# Patient Record
Sex: MALE | Race: WHITE
[De-identification: names, ages, dates, MRNs, and addresses within clinical notes are randomized per-mention and may not be internally consistent; named-entity substitution may affect disease eponyms.]

---

## 2019-11-08 ENCOUNTER — HOSPITAL ENCOUNTER (EMERGENCY)
Dept: HOSPITAL 74 - JER | Age: 79
Discharge: HOME | End: 2019-11-08
Payer: COMMERCIAL

## 2019-11-08 VITALS — HEART RATE: 76 BPM | SYSTOLIC BLOOD PRESSURE: 148 MMHG | DIASTOLIC BLOOD PRESSURE: 72 MMHG

## 2019-11-08 VITALS — TEMPERATURE: 98.7 F

## 2019-11-08 VITALS — BODY MASS INDEX: 24.5 KG/M2

## 2019-11-08 DIAGNOSIS — J43.9: ICD-10-CM

## 2019-11-08 DIAGNOSIS — Z98.890: ICD-10-CM

## 2019-11-08 DIAGNOSIS — Z86.11: ICD-10-CM

## 2019-11-08 DIAGNOSIS — S61.210A: ICD-10-CM

## 2019-11-08 DIAGNOSIS — I25.10: ICD-10-CM
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
## 2019-11-08 DIAGNOSIS — S09.8XXA: Primary | ICD-10-CM

## 2019-11-08 DIAGNOSIS — S00.81XA: ICD-10-CM
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
## 2019-11-08 DIAGNOSIS — Z95.5: ICD-10-CM

## 2019-11-08 LAB
ALBUMIN SERPL-MCNC: 3.5 G/DL (ref 3.4–5)
ALP SERPL-CCNC: 68 U/L (ref 45–117)
ALT SERPL-CCNC: 14 U/L (ref 13–61)
ANION GAP SERPL CALC-SCNC: 5 MMOL/L (ref 8–16)
AST SERPL-CCNC: 14 U/L (ref 15–37)
BASOPHILS # BLD: 0.7 % (ref 0–2)
BILIRUB SERPL-MCNC: 0.5 MG/DL (ref 0.2–1)
BUN SERPL-MCNC: 19.2 MG/DL (ref 7–18)
CALCIUM SERPL-MCNC: 8.7 MG/DL (ref 8.5–10.1)
CHLORIDE SERPL-SCNC: 112 MMOL/L (ref 98–107)
CO2 SERPL-SCNC: 25 MMOL/L (ref 21–32)
CREAT SERPL-MCNC: 0.8 MG/DL (ref 0.55–1.3)
DEPRECATED RDW RBC AUTO: 13.4 % (ref 11.9–15.9)
EOSINOPHIL # BLD: 0.7 % (ref 0–4.5)
GLUCOSE SERPL-MCNC: 84 MG/DL (ref 74–106)
HCT VFR BLD CALC: 44.2 % (ref 35.4–49)
HGB BLD-MCNC: 15.1 GM/DL (ref 11.7–16.9)
LYMPHOCYTES # BLD: 13.3 % (ref 8–40)
MCH RBC QN AUTO: 32.8 PG (ref 25.7–33.7)
MCHC RBC AUTO-ENTMCNC: 34.1 G/DL (ref 32–35.9)
MCV RBC: 96.2 FL (ref 80–96)
MONOCYTES # BLD AUTO: 5.9 % (ref 3.8–10.2)
NEUTROPHILS # BLD: 79.4 % (ref 42.8–82.8)
PLATELET # BLD AUTO: 180 K/MM3 (ref 134–434)
PMV BLD: 8.5 FL (ref 7.5–11.1)
POTASSIUM SERPLBLD-SCNC: 3.8 MMOL/L (ref 3.5–5.1)
PROT SERPL-MCNC: 6.7 G/DL (ref 6.4–8.2)
RBC # BLD AUTO: 4.6 M/MM3 (ref 4–5.6)
SODIUM SERPL-SCNC: 143 MMOL/L (ref 136–145)
WBC # BLD AUTO: 12.5 K/MM3 (ref 4–10)

## 2019-11-08 PROCEDURE — 0HQFXZZ REPAIR RIGHT HAND SKIN, EXTERNAL APPROACH: ICD-10-PCS

## 2019-11-08 PROCEDURE — 3E0234Z INTRODUCTION OF SERUM, TOXOID AND VACCINE INTO MUSCLE, PERCUTANEOUS APPROACH: ICD-10-PCS

## 2019-11-08 NOTE — PDOC
Documentation entered by Hunter Hernández SCRIBE, acting as scribe for Lizzy Conner MD.








Lizzy Conner MD:  This documentation has been prepared by the Betty doan Nirvannie, SCRIBE, under my direction and personally reviewed by me in its 

entirety.  I confirm that the documentation accurately reflects all work, 

treatment, procedures, and medical decision making performed by me.  





Attending Attestation





- Resident


Resident Name: AbrmaJovan





- ED Attending Attestation


I have performed the following: I have examined & evaluated the patient, The 

case was reviewed & discussed with the resident, I agree w/resident's findings 

& plan





- HPI


HPI: 





11/08/19 15:46





Mr. Moore is an 78 y/o M h/o Emphysema, CAD, and TB who presents today s/p 

mechanical fall. 


He lost his balance, fell forward and landed on his hands


(+) head trauma, no LOC, no amnesia 


No chest pain, no shortness of breath, no palpitations


No nausea, vomiting or diarrhea 








- Physicial Exam


PE: 





11/08/19 15:48


GENERAL: The patient is in no acute distress.


HEENT:  bruising and abrasion right lateral orbital ridge, EOMI, PERRLA


Ears normal, nares patent, oropharynx clear without exudates.  


Bite is nmlMoist mucous membranes.  


NECK: Normal range of motion, supple, no midline tenderness to palpation


LUNGS: Breath sounds equal, clear to auscultation bilaterally.  No wheezes, and 

no crackles.


HEART:Regular rate and rhythm, normal S1 and S2 without murmur, rub or gallop.


ABDOMEN: Soft, nontender, normoactive bowel sounds.   


EXTREMITIES: Normal range of motion, no edema.   


NEUROLOGICAL: Cranial nerves II through XII grossly intact.  Normal speech.  No 

focal neurological deficits. 


SKIN: bilateral hand abrasions noted on the dorsum of both hands


11/08/19 16:18








- Medical Decision Making





11/08/19 15:48





79-year-old male presenting to the emergency department status post mechanical 

fall.


No loss of consciousness, no amnesia.





We will do:


Lab


EKG


CT head


Boostrix


Xray hand


ReAssess








Signed out to Dr. Walden pending CT, suture repair, labs


Anticipate discharge

## 2019-11-08 NOTE — PDOC
History of Present Illness





- General


Chief Complaint: Injury


Stated Complaint: FALL


Time Seen by Provider: 11/08/19 14:49





- History of Present Illness


Initial Comments: 





11/08/19 15:10


 Pt is an 80 y/o M with a significant past medical history of Emphysema, CAD, 

and TB who presents to our emergency Department due to a mechanical fall. Pt 

endorses that earlier today, he was walking and lost his balance. Pt endorses 

he landed with his hands facing forward; endorses he hit his head but did not 

lose any consciousness. Endorses lightheadedness before the fall. Denies chest 

pain, palpitations, shortness of breath, changes in vision, nausea/vomiting, or 

confusion,.


PMH CAD s/p 1 stent. Lung resection 2/2 suspicious nodule, emphysema


Social Hx- Smokes [id number] years, denies alcohol


SurgHx- Lung Resection








Past History





- Past Medical History


Allergies/Adverse Reactions: 


 Allergies











Allergy/AdvReac Type Severity Reaction Status Date / Time


 


No Known Allergies Allergy   Verified 11/08/19 15:36











COPD: Yes (emphysema/ TB)





- Surgical History


Cardiac Surgery: Yes (stent x 1 5/20/18 at [facility])





- Psycho Social/Smoking Cessation Hx


Smoking History: Current every day smoker


Number of Cigarettes Smoked Daily: 10


Information on smoking cessation initiated: No


Hx Alcohol Use: No


Drug/Substance Use Hx: No





**Review of Systems





- Review of Systems


Able to Perform ROS?: Yes


Is the patient limited English proficient: No


Constitutional: No: Fever, Weakness


HEENTM: Yes: Blurred Vision (Not a new finding. Endorses h/o blurry vision. ).  

No: Eye Pain, Recent change in vision


Respiratory: No: Cough, Shortness of Breath, Wheezing


Cardiac (ROS): Yes: Lightheadedness.  No: Chest Pain, Irregular Heart Rate, 

Syncope, Chest Tightness


ABD/GI: No: Constipated, Diarrhea, Nausea


Neurological: No: Headache, Numbness, Paresthesia, Unsteady Gait, Ataxia





*Physical Exam





- Vital Signs


 Last Vital Signs











Temp Pulse Resp BP Pulse Ox


 


 98.7 F   68   16   155/72   100 


 


 11/08/19 14:20  11/08/19 14:20  11/08/19 14:20  11/08/19 14:20  11/08/19 14:20














- Physical Exam


HEENT: positive: EOMI, Other (laceration above right eye brow ).  negative: 

Scleral Icterus (R), Scleral Icterus (L)


Neck: positive: Supple


Respiratory/Chest: positive: Decreased Breath Sounds


Cardiovascular: positive: Regular Rhythm, Regular Rate, S1, S2


Gastrointestinal/Abdominal: negative: Distended, Guarding, Rebound, Tenderness


Integumentary: positive: Other (superficial laceration b/l hands. Laceration 

right index finger, no bone or tendon exposure.)


Neurologic: positive: CNs II-XII NML intact, Motor Strength 5/5, Respond to 

painful stimul.  negative: Facial Droop, Numbness, Sensory Deficit





ED Treatment Course





- LABORATORY


CBC & Chemistry Diagram: 


 11/08/19 16:52





 11/08/19 16:52





Medical Decision Making





- Medical Decision Making





11/08/19 15:34


Pt with mechanical fall, head trauma.


Will order Head Ct to assess for any bleed.


Also will order cbc w/ diff, CMP


Will suture right index finger.


Boostrix IM ordered. 


11/08/19 16:57


Performed Laceration repair of right index finger. Full ROM, no sensory loss, 

no exposed bone or tendon.


Informed Pt to rerurn to ER in 5-7 days to remove sutures.


Head CT----> No Ct evidence of acute intracranial pathology. Probable 0.8x0.4 

partially calcified right frontal meningioma. Mild periventricular chronic 

microvascular ischemic changes. Copy given to patient. 


Will d/c patient with close follow up with PMD.  





11/10/19 13:16








Discharge





- Discharge Information


Problems reviewed: Yes


Clinical Impression/Diagnosis: 


 Fall





Condition: Improved


Disposition: HOME





- Admission


No





- Follow up/Referral


Referrals: 


Poornima Aquino [Primary Care Provider] - 





- Patient Discharge Instructions


Patient Printed Discharge Instructions:  DI for Laceration Repair, How to 

Prevent Falls


Additional Instructions: 


You were treated in the Emergency Department due to a head injury due to a fall.











You also received sutures on your right index finger. Please return to the E.R 

in 5-7 days to remove the sutures. 





You may shower with a plastic bag over the finger. 











If you notice any increasing redness, swelling, warmth, or discoloration of the 

finger, please return to the emergency department immediately. If you develop 

any fever, nausea/vomiting, chest pain, headache, shortness of breath or any 

other abnormal symptoms, please return to the emergency department immediately.








Please follow up with your primary care doctor within 1 week.   





- Post Discharge Activity

## 2020-08-28 ENCOUNTER — HOSPITAL ENCOUNTER (INPATIENT)
Dept: HOSPITAL 74 - JER | Age: 80
LOS: 9 days | Discharge: NURSING, REHAB OR LONG-TERM CARE | DRG: 689 | End: 2020-09-06
Attending: NURSE PRACTITIONER | Admitting: INTERNAL MEDICINE
Payer: COMMERCIAL

## 2020-08-28 VITALS — BODY MASS INDEX: 22.7 KG/M2

## 2020-08-28 DIAGNOSIS — E87.6: ICD-10-CM

## 2020-08-28 DIAGNOSIS — Z95.1: ICD-10-CM

## 2020-08-28 DIAGNOSIS — L82.1: ICD-10-CM

## 2020-08-28 DIAGNOSIS — G93.41: ICD-10-CM

## 2020-08-28 DIAGNOSIS — J44.9: ICD-10-CM

## 2020-08-28 DIAGNOSIS — K59.00: ICD-10-CM

## 2020-08-28 DIAGNOSIS — I25.10: ICD-10-CM
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
## 2020-08-28 DIAGNOSIS — I24.8: ICD-10-CM

## 2020-08-28 DIAGNOSIS — I24.9: ICD-10-CM

## 2020-08-28 DIAGNOSIS — R53.1: ICD-10-CM

## 2020-08-28 DIAGNOSIS — R41.82: ICD-10-CM

## 2020-08-28 DIAGNOSIS — N39.0: Primary | ICD-10-CM

## 2020-08-28 DIAGNOSIS — Z90.2: ICD-10-CM

## 2020-08-28 DIAGNOSIS — M62.82: ICD-10-CM

## 2020-08-28 DIAGNOSIS — B96.89: ICD-10-CM

## 2020-08-28 DIAGNOSIS — D72.829: ICD-10-CM

## 2020-08-28 DIAGNOSIS — E44.0: ICD-10-CM

## 2020-08-28 DIAGNOSIS — I12.9: ICD-10-CM

## 2020-08-28 DIAGNOSIS — E78.5: ICD-10-CM

## 2020-08-28 DIAGNOSIS — N18.9: ICD-10-CM

## 2020-08-28 LAB
ALBUMIN SERPL-MCNC: 3.4 G/DL (ref 3.4–5)
ALP SERPL-CCNC: 71 U/L (ref 45–117)
ALT SERPL-CCNC: 31 U/L (ref 13–61)
ANION GAP SERPL CALC-SCNC: 11 MMOL/L (ref 8–16)
APPEARANCE UR: CLEAR
AST SERPL-CCNC: 75 U/L (ref 15–37)
BACTERIA # UR AUTO: (no result) /UL (ref 0–1359)
BASOPHILS # BLD: 0.1 % (ref 0–2)
BILIRUB SERPL-MCNC: 1.9 MG/DL (ref 0.2–1)
BILIRUB UR STRIP.AUTO-MCNC: NEGATIVE MG/DL
BUN SERPL-MCNC: 20.2 MG/DL (ref 7–18)
CALCIUM SERPL-MCNC: 8.7 MG/DL (ref 8.5–10.1)
CASTS URNS QL MICRO: 2 /UL (ref 0–3.1)
CHLORIDE SERPL-SCNC: 108 MMOL/L (ref 98–107)
CO2 SERPL-SCNC: 25 MMOL/L (ref 21–32)
COLOR UR: YELLOW
CREAT SERPL-MCNC: 1.2 MG/DL (ref 0.55–1.3)
DEPRECATED RDW RBC AUTO: 13.9 % (ref 11.9–15.9)
EOSINOPHIL # BLD: 0 % (ref 0–4.5)
EPITH CASTS URNS QL MICRO: 1 /UL (ref 0–25.1)
GLUCOSE SERPL-MCNC: 79 MG/DL (ref 74–106)
HCT VFR BLD CALC: 44.2 % (ref 35.4–49)
HGB BLD-MCNC: 14.6 GM/DL (ref 11.7–16.9)
KETONES UR QL STRIP: (no result)
LEUKOCYTE ESTERASE UR QL STRIP.AUTO: (no result)
LYMPHOCYTES # BLD: 4.2 % (ref 8–40)
MCH RBC QN AUTO: 32.2 PG (ref 25.7–33.7)
MCHC RBC AUTO-ENTMCNC: 33.1 G/DL (ref 32–35.9)
MCV RBC: 97.3 FL (ref 80–96)
MONOCYTES # BLD AUTO: 9.2 % (ref 3.8–10.2)
NEUTROPHILS # BLD: 86.5 % (ref 42.8–82.8)
NITRITE UR QL STRIP: POSITIVE
PH UR: 5 [PH] (ref 5–8)
PLATELET # BLD AUTO: 173 K/MM3 (ref 134–434)
PMV BLD: 8.9 FL (ref 7.5–11.1)
POTASSIUM SERPLBLD-SCNC: 3.4 MMOL/L (ref 3.5–5.1)
PROT SERPL-MCNC: 7.1 G/DL (ref 6.4–8.2)
PROT UR QL STRIP: (no result)
PROT UR QL STRIP: NEGATIVE
RBC # BLD AUTO: 17 /UL (ref 0–23.9)
RBC # BLD AUTO: 4.54 M/MM3 (ref 4–5.6)
SODIUM SERPL-SCNC: 144 MMOL/L (ref 136–145)
SP GR UR: 1.02 (ref 1.01–1.03)
UROBILINOGEN UR STRIP-MCNC: 1 MG/DL (ref 0.2–1)
WBC # BLD AUTO: 19.6 K/MM3 (ref 4–10)
WBC # UR AUTO: 122 /UL (ref 0–25.8)

## 2020-08-28 PROCEDURE — U0003 INFECTIOUS AGENT DETECTION BY NUCLEIC ACID (DNA OR RNA); SEVERE ACUTE RESPIRATORY SYNDROME CORONAVIRUS 2 (SARS-COV-2) (CORONAVIRUS DISEASE [COVID-19]), AMPLIFIED PROBE TECHNIQUE, MAKING USE OF HIGH THROUGHPUT TECHNOLOGIES AS DESCRIBED BY CMS-2020-01-R: HCPCS

## 2020-08-28 NOTE — PDOC
History of Present Illness





- General


Chief Complaint: Injury


Stated Complaint: FALL


Time Seen by Provider: 20 19:46


History Source: Patient


Exam Limitations: No Limitations





- History of Present Illness


Initial Comments: 





20 22:00


78 yo M pmh COPD, HTN, lung resection, h/o TB presenting from home s/p fall with

inability to stand up afterwards. Per EMS, patient was on the ground, unable to 

remain standing without their assistance, unable to remain standing with his w

alker alone. Complaining only of generalized weakness. Son at bedside says he 

seems more confused than this past weekend (normally can hold a complete 

relatively sophisticated conversation) and is talking about having a stroke 4 

weeks ago that did not happen. Also notes some tremulousness that has increased 

since baseline. Patient has had increasingly unsteady gait over the past 2-3 

weeks. Denies headache, nausea, vomiting, fevers, chills, headache, changes in 

his vision. Reports that he was upset with something in his











Past History





- Travel History


Traveled outside of the country in the last 30 days: No


Close contact w/someone who was outside of country & ill: No





- Medical History


Allergies/Adverse Reactions: 


                                    Allergies











Allergy/AdvReac Type Severity Reaction Status Date / Time


 


No Known Allergies Allergy   Verified 19 15:36











COPD: Yes (emphysema/ TB)





- Surgical History


Cardiac Surgery: Yes (stent x 1 18 at [facility])





- Psycho-Social/Smoking History


Smoking History: Former smoker


Have you smoked in the past 12 months: No


Number of Cigarettes Smoked Daily: 20


Information on smoking cessation initiated: Yes





- Substance Abuse Hx (Audit-C & DAST Scrn)


How often the patient has a drink containing alcohol: Never


Score: In Men: 4 or > Positive; In Women: 3 or > Positive: 0


Screen Result (Pos requires Nsg. Audit-10AR): Negative


In the last yr the pt used illegal drug/Rx for NonMed reason: No


Score:  Yes response is considered Positive: 0


Screen Result (Positive result requires Nsg. DAST-10): Negative





**Review of Systems





- Review of Systems


Able to Perform ROS?: Yes


Is the patient limited English proficient: Yes


Constitutional: Yes: Weakness.  No: Chills, Fever


HEENTM: No: Recent change in vision, Nose Pain, Throat Pain


Respiratory: No: Cough, Shortness of Breath, Wheezing, Productive cough


Cardiac (ROS): No: Chest Pain, Edema, Irregular Heart Rate, Lightheadedness, 

Palpitations, Syncope, Chest Tightness


ABD/GI: No: Constipated, Diarrhea, Nausea, Vomiting


: No: Burning, Dysuria, Frequency


Musculoskeletal: Yes: Muscle Weakness.  No: Muscle Pain


Integumentary: No: Bruising, Pruritus, Rash


Neurological: No: Headache, Numbness, Tingling, Weakness


Psychiatric: No: Stressors, Change in Appetite


Endocrine: No: Increased Thirst, Increased Urine, Change in Weight


Hematologic/Lymphatic: No: Anemia, Blood Clots, Easy Bleeding


All Other Systems: Reviewed and Negative





*Physical Exam





- Vital Signs


                                Last Vital Signs











Temp Pulse Resp BP Pulse Ox


 


 99.0 F   74   18   141/60   98 


 


 20 20:25  20 20:25  20 20:25  20 20:25  20 20:25














- Physical Exam





20 22:10


Vitals reviewed, AFVSS 99.0 oral temp


GEN: Appears stated age, NAD, comfortable, confused, room smells of urine. 

AAOx2.


HEENT: NCAT, EOMI, PERRL. Sclera anicteric, non-injected. No facial asymmetry. 

Moist mucous membranes. Normal voice. Trachea midline. 


CV: RRR, S1/S2, no murmurs / rubs / gallops appreciated.


LUNG: CTABL, normal work of breathing. No wheezes, rales, rhonchi. No cough. 

Speaking full sentences. 


GI: Soft, NTND, +BS, no guarding, no rebound. No masses.


EXTREMITIES: 2+ distal pulses. No clubbing / cyanosis / edema. No gross 

deformity in any extremity.


SKIN: Warm, dry, no rashes appreciated, non-jaundiced.


PSYCH: Normal mood and affect. Cooperative and appropriate. 


NEURO: CN grossly intact. Moving all extremities equally. Normal symmetric 

strength and sensation of light touch.








ED Treatment Course





- LABORATORY


CBC & Chemistry Diagram: 


                                 20 21:00





                                 20 21:00





- ADDITIONAL ORDERS


Additional order review: 


                               Laboratory  Results











  20





  21:10 21:00


 


Lactic Acid   1.6


 


Urine Color  Yellow 


 


Urine Appearance  Clear 


 


Urine pH  5.0 


 


Ur Specific Gravity  1.024 


 


Urine Protein  2+ H 


 


Urine Glucose (UA)  Negative 


 


Urine Ketones  3+ H 


 


Urine Blood  3+ H 


 


Urine Nitrite  Positive H 


 


Urine Bilirubin  Negative 


 


Urine Urobilinogen  1.0 


 


Ur Leukocyte Esterase  1+ H 


 


Urine WBC (Auto)  122 


 


Urine RBC (Auto)  17 


 


Urine Casts (Auto)  2 


 


U Epithel Cells (Auto)  1 


 


Urine Bacteria (Auto)  >9,000 








                                        











  20





  21:00


 


RBC  4.54


 


MCV  97.3 H


 


MCHC  33.1


 


RDW  13.9


 


MPV  8.9


 


Neutrophils %  86.5 H


 


Lymphocytes %  4.2 L D


 


Monocytes %  9.2


 


Eosinophils %  0.0  D


 


Basophils %  0.1














- RADIOLOGY


Radiology Studies Ordered: 














 Category Date Time Status


 


 HEAD CT WITHOUT CONTRAST [CT] Stat CT Scan  20 21:40 Taken


 


 CHEST X-RAY PORTABLE* [RAD] Stat Radiology  20 20:29 Taken














Medical Decision Making





- Medical Decision Making





20 22:12


80yo M pmh HTN, COPD, lung resection, h/o TB, presenting s/p fall with 

generalized weakness and inability to get back up. History notable for 

progressive difficulty walking over 2-3 weeks, fall with weakness preventing him

 from getting back up. Exam notable for dark foul smelling urine, fatigue. DDX: 

Infection (UTI vs PNA), CVA, electrolyte abnormality, neurologic disorder.





- Sepsis workup


- NCHCT


- EKG





20 22:28


Labs notable for leukocytosis to 20, elevated troponin 0.37, elevated CK, UTI


LR 1L ordered, plan for repeat troponin afterwards


Ofirmev





EKbpm, sinus, normal axis, notable for significant LVH, T wave biphasic in 

V4, inversion in V5 and V6


Patient adamantly denies chest pain, pressure, shortness of breath





Dispo: Admit Tele for troponin elevation in setting of sepsis with suspected 

urinary source








Discharge





- Discharge Information


Problems reviewed: Yes


Clinical Impression/Diagnosis: 


Fall


Qualifiers:


 Encounter type: initial encounter Qualified Code(s): W19.XXXA - Unspecified 

fall, initial encounter





UTI (urinary tract infection)


Qualifiers:


 Urinary tract infection type: site unspecified Hematuria presence: with 

hematuria Qualified Code(s): N39.0 - Urinary tract infection, site not specified





Condition: Guarded





- Admission


Yes





- Follow up/Referral





- Patient Discharge Instructions





- Post Discharge Activity

## 2020-08-28 NOTE — PN
Teaching Attending Note


Name of Resident: Margo Kendall





ATTENDING PHYSICIAN STATEMENT





I saw and evaluated the patient.


I reviewed the resident's note and discussed the case with the resident.


I agree with the resident's findings and plan as documented.








SUBJECTIVE:


Patient is an 79 year old man with a PMH of COPD, HTN, Lung resection (?for 

Tuberculosis), AAA and Cardiac stents presenting from home after a fall with 

inability to stand up afterwards. Per EMS, patient was on the ground, unable to 

remain standing without their assistance, unable to remain standing with his 

walker alone. Complaining only of generalized weakness. Son at bedside says he 

seems more confused than this past weekend (normally can hold a complete 

relatively sophisticated conversation) and is talking about having a stroke 4 

weeks ago that did not happen. Also notes some tremulousness that has increased 

since baseline. Patient has had increasingly unsteady gait over the past 2-3 

weeks. Denies headache, nausea, vomiting, fevers, chills, headache or changes in

his vision. Patient denies chest pain, shortness of breath, abdominal pain, 

diarrhea, constipation, dysuria, frequency, urgency, melena, hematochezia or 

hematuria. Denies alcohol, tobacco or illicit drug use. No sick contacts or 

recent travels. Family history is unremarkable.  





OBJECTIVE:


Alert and weak


                                   Vital Signs











 Period  Temp  Pulse  Resp  BP Sys/Perez  Pulse Ox


 


 Last 24 Hr  99.0 F-100.0 F  74  18  141/60  98








HEENT: No Jaundice, eye redness or discharge, PERRLA, EOMI. Normocephalic, 

atraumatic; left side keratotic scalp lesion;. External ears are normal and 

hearing is grossly intact. No nasal discharge.


Neck: Supple, nontender. No palpable adenopathy or thyromegaly. No JVD


Chest: Good effort. Clear to auscultation and percussion.


Heart: Regular. No S3, rub or murmur


Abdomen: Not distended, soft, nontender and no HSM. No rebound or guarding. 

Normal bowel sounds.


Ext: Peripheral pulses intact. No leg edema. Left thigh keratotic lesion.


Skin: Warm and dry. No petechiae, rash or ecchymosis.


Neuro: Alert. Oriented to person and place. CN 2-12 grossly intact. Sensation 

grossly intact in all four extremities and DTR are symmetric. Gait not tested 

for safety reasons.


Psych: Appropriate mood and affect. Good insight.


                              Abnormal Lab Results











  08/28/20 08/28/20 08/28/20





  21:00 21:00 21:10


 


WBC  19.6 H  


 


MCV  97.3 H  


 


Absolute Neuts (auto)  17.0 H  


 


Neutrophils %  86.5 H  


 


Lymphocytes %  4.2 L D  


 


Potassium   3.4 L 


 


Chloride   108 H 


 


BUN   20.2 H 


 


Total Bilirubin   1.9 H 


 


AST   75 H 


 


Creatine Kinase   2392 H 


 


CK-MB (CK-2)   8.6 H 


 


Troponin I   0.37 H 


 


Urine Protein    2+ H


 


Urine Ketones    3+ H


 


Urine Blood    3+ H


 


Urine Nitrite    Positive H


 


Ur Leukocyte Esterase    1+ H








                               Current Medications











Generic Name Dose Route Start Last Admin





  Trade Name Freq  PRN Reason Stop Dose Admin


 


Enoxaparin Sodium  40 mg  08/29/20 10:00 





  Lovenox -  SQ  





  DAILY MARISSA  


 


Sodium Chloride  1,000 mls @ 75 mls/hr  08/29/20 00:30 





  Normal Saline -  IV  





  ASDIR MARISSA  


 


Potassium Chloride  10 meq in 100 mls @ 100 mls/hr  08/29/20 00:45 





  Potassium Chloride 10 Meq Premix Ivpb -  IVPB  08/29/20 03:44 





  Q60M [facility]  








ASSESSMENT AND PLAN:


1. Sepsis due to UTI/?Toxic metabolic encephalopathy - No evidence of acute 

intracranial pathology on noncontrast head CT scan; but shows calcified 

meningioma and soft tissue thickening in left frontal scalp. CXR shows RLL 

atelecatasis. Sepsis workup done and patient started on IV Ceftriaxone and IV 

NS. 


EKG shows NSR at 84/minute and QTc 430 with ST depression/J point depression in 

II, III and T wave inversion in V5,V6 and LVH. No old EKG available for 

comparison. Initial troponin is 0.37 - may be demand ischemia and/or decreased 

clearance, but he has cardiac stents - will rule out ACS. 





Has rhabdomyolysis and elevated LFTs. Hypokalemia is unexplained. Will check 

phosphate, serum magnesium, give IV and PO KCL. Will admit to telemetry, trend 

troponin, repeat EKG, get ECHO, RUQ sonogram, trend LFTS, avoid hepatotoxic 

drugs, get fasting lipids, brain MRI, do speech and swallow evaluation, 

neurochecks and implement fall/aspiration/seizure precautions. Consult 

Cardiology/PT/Neurology/ID. Viral testing for COVID-19 ordered and patient 

placed on airborne, droplet and contact isolation. Continue the outpatient 

Dermatology evaluation for left scalp and thigh "keratotic" skin lesion. Will 

continue comprehensive care for all of patients comorbid conditions.





2. Hypertension  Will restart suitable outpatient antihypertensive drugs when 

clinically appropriate. Subsequently, will revise regimen to ensure 

round-the-clock excellent BP control. Patient counseled on the injurious effects

of uncontrolled hypertension. Nonpharmacologic measures to control hypertension 

like weight loss, salt restriction and exercise stressed. Importance of 

adherence to treatment regimen and attainment of normotension emphasized. 





3. DVT prophylaxis - Lovenox 40 mg SQ q 24 hours.    





4. Advance directives - Full code

## 2020-08-28 NOTE — PDOC
Attending Attestation





- Resident


Resident Name: Nuno Awan





- HPI


HPI: 





08/28/20 23:30


Pt presents to the ED complaining of generalized weakness after fall.  Denies 

LOC, but states that he was unable to get up.  on my exam, the patient is 

confused, which is not his baseline as per his son. 


08/29/20 00:24








- Physicial Exam


PE: 





08/29/20 00:25


Agree with resident exam.  Patient is alert and oriented and in no acute 

distress.  Lungs are clear.  CV rr + systolic murmur.  Abdomen soft, non tender,

non distended.  Neuro: alert, oriented x 1.  CN grossly intact. 





- Medical Decision Making





08/29/20 00:27


Pt presents to the ED complaining of generalized weakness.  COnfused on exam.  

Labs show UTI, CT head is negative for acute changes.  Will start antibiotics 

and admit to medicine. 





Discharge





- Discharge Information


Problems reviewed: Yes


Clinical Impression/Diagnosis: 


 Fall





UTI (urinary tract infection)


Qualifiers:


 Urinary tract infection type: site unspecified Hematuria presence: with 

hematuria Qualified Code(s): N39.0 - Urinary tract infection, site not specified





Condition: Guarded





- Follow up/Referral





- Patient Discharge Instructions





- Post Discharge Activity

## 2020-08-29 LAB
ALBUMIN SERPL-MCNC: 3.3 G/DL (ref 3.4–5)
ALP SERPL-CCNC: 73 U/L (ref 45–117)
ALT SERPL-CCNC: 41 U/L (ref 13–61)
ANION GAP SERPL CALC-SCNC: 9 MMOL/L (ref 8–16)
APTT BLD: 23.8 SECONDS (ref 25.2–36.5)
APTT BLD: 31.5 SECONDS (ref 25.2–36.5)
AST SERPL-CCNC: 126 U/L (ref 15–37)
BASOPHILS # BLD: 0.3 % (ref 0–2)
BILIRUB SERPL-MCNC: 1.9 MG/DL (ref 0.2–1)
BUN SERPL-MCNC: 20.8 MG/DL (ref 7–18)
CALCIUM SERPL-MCNC: 8.5 MG/DL (ref 8.5–10.1)
CHLORIDE SERPL-SCNC: 107 MMOL/L (ref 98–107)
CO2 SERPL-SCNC: 25 MMOL/L (ref 21–32)
CREAT SERPL-MCNC: 1.2 MG/DL (ref 0.55–1.3)
DEPRECATED RDW RBC AUTO: 13.8 % (ref 11.9–15.9)
EOSINOPHIL # BLD: 0 % (ref 0–4.5)
GLUCOSE SERPL-MCNC: 66 MG/DL (ref 74–106)
HCT VFR BLD CALC: 43.5 % (ref 35.4–49)
HGB BLD-MCNC: 14.7 GM/DL (ref 11.7–16.9)
INR BLD: 1.28 (ref 0.83–1.09)
INR BLD: 1.32 (ref 0.83–1.09)
LYMPHOCYTES # BLD: 5.7 % (ref 8–40)
MAGNESIUM SERPL-MCNC: 1.8 MG/DL (ref 1.8–2.4)
MCH RBC QN AUTO: 33.2 PG (ref 25.7–33.7)
MCHC RBC AUTO-ENTMCNC: 33.8 G/DL (ref 32–35.9)
MCV RBC: 98.1 FL (ref 80–96)
MONOCYTES # BLD AUTO: 4 % (ref 3.8–10.2)
NEUTROPHILS # BLD: 90 % (ref 42.8–82.8)
PHOSPHATE SERPL-MCNC: 2.4 MG/DL (ref 2.5–4.9)
PLATELET # BLD AUTO: 167 K/MM3 (ref 134–434)
PMV BLD: 9.4 FL (ref 7.5–11.1)
POTASSIUM SERPLBLD-SCNC: 4 MMOL/L (ref 3.5–5.1)
PROT SERPL-MCNC: 7 G/DL (ref 6.4–8.2)
PT PNL PPP: 15.1 SEC (ref 9.7–13)
PT PNL PPP: 15.6 SEC (ref 9.7–13)
RBC # BLD AUTO: 4.44 M/MM3 (ref 4–5.6)
SODIUM SERPL-SCNC: 142 MMOL/L (ref 136–145)
WBC # BLD AUTO: 16.8 K/MM3 (ref 4–10)

## 2020-08-29 RX ADMIN — SODIUM CHLORIDE SCH MLS/HR: 9 INJECTION, SOLUTION INTRAVENOUS at 01:47

## 2020-08-29 RX ADMIN — ENOXAPARIN SODIUM SCH MG: 40 INJECTION SUBCUTANEOUS at 09:46

## 2020-08-29 RX ADMIN — POTASSIUM CHLORIDE SCH MLS/HR: 7.46 INJECTION, SOLUTION INTRAVENOUS at 04:22

## 2020-08-29 RX ADMIN — CEFTRIAXONE SCH MLS/HR: 1 INJECTION, POWDER, FOR SOLUTION INTRAMUSCULAR; INTRAVENOUS at 22:06

## 2020-08-29 RX ADMIN — ASPIRIN SCH MG: 81 TABLET, COATED ORAL at 18:02

## 2020-08-29 RX ADMIN — ATORVASTATIN CALCIUM SCH MG: 40 TABLET, FILM COATED ORAL at 22:05

## 2020-08-29 RX ADMIN — POTASSIUM CHLORIDE SCH MLS/HR: 7.46 INJECTION, SOLUTION INTRAVENOUS at 01:47

## 2020-08-29 RX ADMIN — POTASSIUM CHLORIDE SCH MLS/HR: 7.46 INJECTION, SOLUTION INTRAVENOUS at 02:46

## 2020-08-29 NOTE — CON.CARD
Cardiology Consult (text)





- Consultation


Consultation Note: 


Chief Complaint: Events noted, notes reviewed, unwitnessed fall, questionable 

syncopal episode, generalized weakness, evidence of demand ischemic injury- 

patient currently denies any chest discomfort or dyspnea





History of Present Illness: 


Seen and examined on telemetry. Full consult dictated





History was obtained from patient's son Kin who was contacted via telephone





Medications: 


                                        


                               Current Medications











Generic Name Dose Route Start Last Admin





  Trade Name Freq  PRN Reason Stop Dose Admin


 


Enoxaparin Sodium  40 mg  08/29/20 10:00  08/29/20 09:46





  Lovenox -  SQ   40 mg





  DAILY MARISSA   Administration


 


Sodium Chloride  1,000 mls @ 75 mls/hr  08/29/20 00:30  08/29/20 01:47





  Normal Saline -  IV   75 mls/hr





  ASDIR MARISSA   Administration


 


Ceftriaxone Sodium 1 gm/  50 mls @ 100 mls/hr  08/29/20 22:00 





  Dextrose  IVPB  





  Q24H MARISSA  





  Protocol  











Review of Systems





Unable to obtain/patient is confused and disoriented





Vital Signs: 


                                Last Vital Signs











Temp Pulse Resp BP Pulse Ox


 


 99.6 F   84   24 H  122/61   96 


 


 08/29/20 10:15  08/29/20 10:12  08/29/20 10:12  08/29/20 10:12  08/29/20 09:00








                                 Intake & Output











 08/26/20 08/27/20 08/28/20 08/29/20





 23:59 23:59 23:59 23:59


 


Intake Total    250


 


Output Total    150


 


Balance    100


 


Weight   145 lb 145 lb 0.004 oz











Neck:  Supple Negative JVD


Respiratory:  Diminished Breath Sounds at the Bases


Cardiovascular:  S1 S2 Regular Rate Rhythm


Gastrointestinal:  Soft Benign Normal Bowel Sounds


Ext: Negative Edema  





Labs:


                                        


                                  Troponin, BNP











  08/28/20 08/29/20 08/29/20





  21:00 01:30 05:40


 


Troponin I  0.37 H  0.44 H  0.39 H








                                    CBC, BMP





                                 08/29/20 05:40 





                                 08/29/20 05:40 





                                  Hepatic Panel











Total Bilirubin  1.9 mg/dL (0.2-1)  H  08/29/20  05:40    


 


Direct Bilirubin  0.5 mg/dL (0.0-0.2)  H  08/29/20  05:40    


 


AST  126 U/L (15-37)  H  08/29/20  05:40    


 


ALT  41 U/L (13-61)   08/29/20  05:40    


 


Alkaline Phosphatase  73 U/L ()   08/29/20  05:40    


 


Albumin  3.3 g/dl (3.4-5.0)  L  08/29/20  05:40    








                                    INR, PTT











INR  1.28  (0.83-1.09)  H  08/29/20  05:40    











 Assessment/Plan 





ASSESSMENT:





1.  Unwitnessed fall unclear mechanical versus a syncopal episode


2.  Coronary artery disease post percutaneous coronary intervention with 

evidence of demand ischemic injury angina pectoris (Patient has not had coronary

artery bypass graft surgery as stated in the notes)


3.  Diastolic left ventricular dysfunction with class 0 New [employer] Heart 

Association classification left ventricular failure


4.  Hypertensive cardiovascular disease


5.  Hypercholesterolemia


6.  Organic brain syndrome/dementia


7.  History of lung surgery partial resection


8.  History of chronic obstructive pulmonary disease


9.  Urinary tract infection


10.  Chronic kidney disease


11.  Abnormal liver function testing








PLAN:





1.  Recommend the addition of beta-blocker therapy unless it is absolutely 

contraindicated- Toprol-XL at 25 mg once daily


2.  Recommend the addition of statin therapy unless it is absolutely 

contraindicated


3.  Recommend initiation of Ecotrin therapy


4.  Antibiotics as per the primary team


5.  Echocardiography for evaluation of left ventricular systolic 

function/valvular function





Planned treatment/evaluation was reviewed in detail with patient's son Kin Miller MD

## 2020-08-29 NOTE — EKG
Test Reason : 

Blood Pressure : ***/*** mmHG

Vent. Rate : 084 BPM     Atrial Rate : 084 BPM

   P-R Int : 166 ms          QRS Dur : 092 ms

    QT Int : 364 ms       P-R-T Axes : 088 065 090 degrees

   QTc Int : 430 ms

 

NORMAL SINUS RHYTHM

LEFT VENTRICULAR HYPERTROPHY WITH REPOLARIZATION ABNORMALITY

CANNOT RULE OUT SEPTAL INFARCT , AGE UNDETERMINED

ABNORMAL ECG

NO PREVIOUS ECGS AVAILABLE

Confirmed by IVA CHINCHILLA MD ([id number]) on 8/29/2020 10:15:45 AM

 

Referred By:             Confirmed By:IVA CHINCHILLA MD

## 2020-08-29 NOTE — HP
CHIEF COMPLAINT: fall





PCP: Dr. Marquez ([facility])





HISTORY OF PRESENT ILLNESS:


79 y.o. 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 

was BIB his son after a fall. The son was present at bedside during my exam to 

support the pt's story. Patient says he was sleeping and woke up to use the 

bathroom. When he tried to stand up he fell to the ground and wasn't able to 

stand up. He was able to call for help and was brought to the ED. As per son 

patients mental status is altered from his baseline (normally aox3, complete 

sentences). According to the son, the pt's memory has been progressively 

declining over the past 6 months, however since the fall today he has noticed a 

rapid decline in mental status. At home he ambulates with a cane and is mobile 

on his own.








ER course was notable for:


(1) Rocephin 1g


(2) 2L IVF


(3) EKG: NSR. LVH. rate 84 bpm. qtc 430. ST elevation in V3 no prior comparison 

studies. Repeat EKG @ 4:28AM showing decreasing ST interval in V3. 





Recent Travel: denies





PAST MEDICAL HISTORY: as per hpi





PAST SURGICAL HISTORY: PCI, AAA repair, lobectomy 2/2 TB





Social History: lives alone in independent living housing


Smoking: denies


Alcohol:denies


Drugs: denies





Allergies





No Known Allergies Allergy (Verified 11/08/19 15:36)


   








HOME MEDICATIONS:


REVIEW OF SYSTEMS


CONSTITUTIONAL: 


Absent:  fever, chills, diaphoresis, generalized weakness, malaise, loss of 

appetite, weight change


HEENT: 


Absent:  rhinorrhea, nasal congestion, throat pain, throat swelling, difficulty 

swallowing, mouth swelling, ear pain, eye pain, visual changes


CARDIOVASCULAR: 


Absent: chest pain, syncope, palpitations, irregular heart rate, 

lightheadedness, peripheral edema


RESPIRATORY: 


Absent: cough, shortness of breath, dyspnea with exertion, orthopnea, wheezing, 

stridor, hemoptysis


GASTROINTESTINAL:


Absent: abdominal pain, abdominal distension, nausea, vomiting, diarrhea, 

constipation, melena, hematochezia


GENITOURINARY: 


Absent: dysuria, frequency, urgency, hesitancy, hematuria, flank pain, genital 

pain


MUSCULOSKELETAL: 


Absent: myalgia, arthralgia, joint swelling, back pain, neck pain


SKIN: 


Absent: rash, itching, pallor


HEMATOLOGIC/IMMUNOLOGIC: 


Absent: easy bleeding, easy bruising, lymphadenopathy, frequent infections


ENDOCRINE:


Absent: unexplained weight gain, unexplained weight loss, heat intolerance, cold

intolerance


NEUROLOGIC: 


Absent: headache, focal weakness or paresthesias, dizziness, unsteady gait, 

seizure, mental status changes, bladder or bowel incontinence


PSYCHIATRIC: 


Absent: anxiety, depression, suicidal or homicidal ideation, hallucinations.








PHYSICAL EXAMINATION


                               Vital Signs - 24 hr











  08/28/20 08/28/20 08/28/20





  20:25 23:10 23:55


 


Temperature 99.0 F 100.0 F H 


 


Pulse Rate 74  


 


Pulse Rate [   75





Left Radial]   


 


Respiratory 18  20





Rate   


 


Blood Pressure 141/60  


 


Blood Pressure   123/57 L





[Right Arm]   


 


O2 Sat by Pulse 98  95





Oximetry (%)   














  08/29/20





  02:43


 


Temperature 


 


Pulse Rate 


 


Pulse Rate [ 70





Left Radial] 


 


Respiratory 18





Rate 


 


Blood Pressure 


 


Blood Pressure 109/89





[Right Arm] 


 


O2 Sat by Pulse 96





Oximetry (%) 











GENERAL: Awake, alert, and fully oriented, in no acute distress.


HEENT: left frontal scalp has a chronic soft tissue swelling with areas of 

fungating outgrowth.


LUNGS: Breath sounds equal, clear to auscultation bilaterally. No wheezes, and 

no crackles. No accessory muscle use.


HEART: Regular rate and rhythm, normal S1 and S2.  + systolic murmur left 

sternal border


ABDOMEN: Soft, nontender, not distended, normoactive bowel sounds, no guarding


EXTREMITIES: 2+ pulses, warm, well-perfused. No calf tenderness. No peripheral 

edema. Left media thigh keratosis


NEUROLOGICAL:  Cranial nerves II-XII intact. 


PSYCHIATRIC: Cooperative. Good eye contact. Appropriate mood and affect.


SKIN: Warm, dry, normal turgor, no rashes or lesions noted, normal capillary 

refill. 


                         Laboratory Results - last 24 hr











  08/28/20 08/28/20 08/28/20





  21:00 21:00 21:00


 


WBC  19.6 H  


 


RBC  4.54  


 


Hgb  14.6  


 


Hct  44.2  


 


MCV  97.3 H  


 


MCH  32.2  


 


MCHC  33.1  


 


RDW  13.9  


 


Plt Count  173  


 


MPV  8.9  


 


Absolute Neuts (auto)  17.0 H  


 


Neutrophils %  86.5 H  


 


Lymphocytes %  4.2 L D  


 


Monocytes %  9.2  


 


Eosinophils %  0.0  D  


 


Basophils %  0.1  


 


Nucleated RBC %  0  


 


PT with INR   


 


INR   


 


PTT (Actin FS)   


 


Sodium   144 


 


Potassium   3.4 L 


 


Chloride   108 H 


 


Carbon Dioxide   25 


 


Anion Gap   11 


 


BUN   20.2 H 


 


Creatinine   1.2 


 


Est GFR (CKD-EPI)AfAm   65.79 


 


Est GFR (CKD-EPI)NonAf   56.77 


 


Random Glucose   79 


 


Lactic Acid    1.6


 


Calcium   8.7 


 


Total Bilirubin   1.9 H 


 


AST   75 H 


 


ALT   31 


 


Alkaline Phosphatase   71 


 


Creatine Kinase   2392 H 


 


Creatine Kinase Index   0.3 


 


CK-MB (CK-2)   8.6 H 


 


Troponin I   0.37 H 


 


Total Protein   7.1 


 


Albumin   3.4 


 


Urine Color   


 


Urine Appearance   


 


Urine pH   


 


Ur Specific Gravity   


 


Urine Protein   


 


Urine Glucose (UA)   


 


Urine Ketones   


 


Urine Blood   


 


Urine Nitrite   


 


Urine Bilirubin   


 


Urine Urobilinogen   


 


Ur Leukocyte Esterase   


 


Urine WBC (Auto)   


 


Urine RBC (Auto)   


 


Urine Casts (Auto)   


 


U Epithel Cells (Auto)   


 


Urine Bacteria (Auto)   














  08/28/20 08/29/20 08/29/20





  21:10 01:30 01:30


 


WBC   


 


RBC   


 


Hgb   


 


Hct   


 


MCV   


 


MCH   


 


MCHC   


 


RDW   


 


Plt Count   


 


MPV   


 


Absolute Neuts (auto)   


 


Neutrophils %   


 


Lymphocytes %   


 


Monocytes %   


 


Eosinophils %   


 


Basophils %   


 


Nucleated RBC %   


 


PT with INR   15.60 H 


 


INR   1.32 H 


 


PTT (Actin FS)   31.5 


 


Sodium   


 


Potassium   


 


Chloride   


 


Carbon Dioxide   


 


Anion Gap   


 


BUN   


 


Creatinine   


 


Est GFR (CKD-EPI)AfAm   


 


Est GFR (CKD-EPI)NonAf   


 


Random Glucose   


 


Lactic Acid   


 


Calcium   


 


Total Bilirubin   


 


AST   


 


ALT   


 


Alkaline Phosphatase   


 


Creatine Kinase   


 


Creatine Kinase Index   


 


CK-MB (CK-2)   


 


Troponin I    0.44 H


 


Total Protein   


 


Albumin   


 


Urine Color  Yellow  


 


Urine Appearance  Clear  


 


Urine pH  5.0  


 


Ur Specific Gravity  1.024  


 


Urine Protein  2+ H  


 


Urine Glucose (UA)  Negative  


 


Urine Ketones  3+ H  


 


Urine Blood  3+ H  


 


Urine Nitrite  Positive H  


 


Urine Bilirubin  Negative  


 


Urine Urobilinogen  1.0  


 


Ur Leukocyte Esterase  1+ H  


 


Urine WBC (Auto)  122  


 


Urine RBC (Auto)  17  


 


Urine Casts (Auto)  2  


 


U Epithel Cells (Auto)  1  


 


Urine Bacteria (Auto)  >9,000  











ASSESSMENT/PLAN:


79 y.o. 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 

was BIB his son after a fall.





#Acute toxic metabolic encephalopathy 2/2 UTI


-UA with many bacteria & wbcs


-s/p rocephin in ED; continue


-continue IVF, NS @75cc/hr


-CT head showing subcutaneous soft tissue thickening at the left frontal scalp- 

may be due to dermatologic lesion. Also shows frontal dural calcified meningioma





#EKG changes r/o ACS


-trop 0.37, 0.44- trend


-Noted on EKG: ST elevation in V3. TWI V5 & V6


-f/u repeat EKG stat


-patient denying chest pain currently


-cardiology consulted for EKG changes (Dr. Avelar)


-monitor on telemetry





#Rhabdomyolysis


-trend CPK


-IVF





#Elevated T. bili


-f/u direct bili 


-f/u RUQ U/S





#Seborrheic keratosis


-present on left scalp and left medial thigh


-patient does not endorse discomfort


-CT head negative for acute pathology





#FEN


-NS @75 cc/hr


-K+ repleted


-NPO pending cardio recs





#PPX: lovenox 40 sq daily





#Dispo


telemetry





Family Medical History


Family History: Unable to Obtain





Visit type





- Medication Review


Med list reviewed for High Risk Meds patients 65 and older: Yes





- Emergency Visit


Emergency Visit: Yes


ED Registration Date: 08/28/20


Care time: The patient presented to the Emergency Department on the above date 

and was hospitalized for further evaluation of their emergent condition.





- New Patient


This patient is new to me today: Yes


Date on this admission: 08/30/20





- Critical Care


Critical Care patient: No





ATTENDING PHYSICIAN STATEMENT





I saw and evaluated the patient.


I reviewed the resident's note and discussed the case with the resident.


I agree with the resident's findings and plan as documented.








SUBJECTIVE:








OBJECTIVE:








ASSESSMENT AND PLAN:

## 2020-08-29 NOTE — EKG
Test Reason : 

Blood Pressure : ***/*** mmHG

Vent. Rate : 084 BPM     Atrial Rate : 084 BPM

   P-R Int : 158 ms          QRS Dur : 090 ms

    QT Int : 388 ms       P-R-T Axes : 089 067 087 degrees

   QTc Int : 458 ms

 

SINUS RHYTHM WITH OCCASIONAL PREMATURE VENTRICULAR COMPLEXES

VOLTAGE CRITERIA FOR LEFT VENTRICULAR HYPERTROPHY

CANNOT RULE OUT SEPTAL INFARCT (CITED ON OR BEFORE 28-AUG-2020)

ABNORMAL ECG

WHEN COMPARED WITH ECG OF 28-AUG-2020 21:10,

PREMATURE VENTRICULAR COMPLEXES ARE NOW PRESENT

NONSPECIFIC T WAVE ABNORMALITY HAS REPLACED INVERTED T WAVES IN 

LATERAL LEADS

Confirmed by IVA CHINCHILLA MD ([id number]) on 8/29/2020 10:14:58 AM

 

Referred By:             Confirmed By:IVA CHINCHILLA MD

## 2020-08-29 NOTE — PN
Progress Note, Physician


Chief Complaint: 





Seen and examined in ICU. Pt very anxious about hospitalization.States he feels 

very weak. No chest pain or shortness of breath. Cardiac w/u in progress


History of Present Illness: 





79 y.o. 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 

was BIB his son after a fall.











- Current Medication List


Current Medications: 


Active Medications





Enoxaparin Sodium (Lovenox -)  40 mg SQ DAILY MARISSA


Sodium Chloride (Normal Saline -)  1,000 mls @ 75 mls/hr IV ASDIR MARISSA


   Last Admin: 08/29/20 01:47 Dose:  75 mls/hr


   Documented by: 


Ceftriaxone Sodium 1 gm/ (Dextrose)  50 mls @ 100 mls/hr IVPB Q24H MARISSA; Protocol











- Objective


Vital Signs: 


                                   Vital Signs











Temperature  98.3 F   08/29/20 04:00


 


Pulse Rate  96 H  08/29/20 06:00


 


Respiratory Rate  20   08/29/20 06:00


 


Blood Pressure  120/51 L  08/29/20 06:00


 


O2 Sat by Pulse Oximetry (%)  98   08/29/20 06:00











Constitutional: Yes: No Distress, Anxious, Thin


Eyes: Yes: WNL, Conjunctiva Clear


HENT: Yes: WNL, Atraumatic, Normocephalic, Other ( left scalp lesion noted)


Neck: Yes: WNL, Supple, Trachea Midline


Cardiovascular: Yes: WNL, Regular Rate and Rhythm


Respiratory: Yes: WNL, Regular, CTA Bilaterally


Gastrointestinal: Yes: WNL, Normal Bowel Sounds


...Rectal Exam: Yes: Deferred


Genitourinary: Yes: WNL


Breast(s): Yes: WNL


Musculoskeletal: Yes: WNL


Extremities: Yes: WNL


Edema: No


Peripheral Pulses WNL: Yes


Peripheral Pulses: Left Radial: 2+, Right Radial: 2+, Left Doralis Pedis: 2+, 

Right Dorsalis Pedis: 2+, Left Femoral: 2+, Right Femoral: 2+


Integumentary: Yes: Other (Seborrheic keratosis present on left scalp and left 

medial thigh)


Neurological: Yes: WNL, Alert, Oriented


...Motor Strength: LLE, RLE (generalized weakness)


Psychiatric: Yes: WNL, Alert, Oriented


Labs: 


                                    INR, PTT











INR  1.32  (0.83-1.09)  H  08/29/20  01:30    














- ....Imaging


Chest X-ray: Image Reviewed (no effusion/imfiltrates)


Cat Scan: Report Reviewed (HCT no acute pathology)


Ultrasound: Report Reviewed (fatty infiltattion of lover, AAA with stent)





Problem List





- Problems


(1) COPD (chronic obstructive pulmonary disease)


Assessment/Plan: 


Supplemental O2 to maintain SPO2 >90%


dup nebs prn


Code(s): J44.9 - CHRONIC OBSTRUCTIVE PULMONARY DISEASE, UNSPECIFIED   





(2) HTN (hypertension)


Assessment/Plan: 


normotensive


metoprolol as per cardiology with hold parameters


Code(s): I10 - ESSENTIAL (PRIMARY) HYPERTENSION   





(3) CAD (coronary artery disease)


Assessment/Plan: 


c/w asa,statin,betablocker


appreciate cardiology consultation 


Code(s): I25.10 - ATHSCL HEART DISEASE OF NATIVE CORONARY ARTERY W/O ANG PCTRS  







(4) H/O pneumonectomy


Code(s): Z98.890 - OTHER SPECIFIED POSTPROCEDURAL STATES; Z90.2 - ACQUIRED 

ABSENCE OF LUNG [PART OF]   





(5) S/P CABG (coronary artery bypass graft)


Code(s): Z95.1 - PRESENCE OF AORTOCORONARY BYPASS GRAFT   





(6) Prophylactic measure


Assessment/Plan: 


FEN


Fluids: adequate PO intake


Electrolytes: monitor & replete as needed


Nutrition: low sodium diet





DVT


moderate risk


sq heparin





Dispo


Maintain as inpatient


full code


discharge planning to home vs snf


Code(s): Z29.9 - ENCOUNTER FOR PROPHYLACTIC MEASURES, UNSPECIFIED   





(7) Acute metabolic encephalopathy


Assessment/Plan: 


multifactorial, UTI, advanced age


HCT negative for acute pathology


c/w abx


PT


supportive care





Code(s): G93.41 - METABOLIC ENCEPHALOPATHY   





(8) Rhabdomyolysis


Assessment/Plan: 


CK 2392>3647


c/w IVF


c/t trend


Code(s): M62.82 - RHABDOMYOLYSIS   





(9) Seborrheic keratosis


Code(s): L82.1 - OTHER SEBORRHEIC KERATOSIS   





(10) Fall


Assessment/Plan: 


PT ordered


fall precvautions


monitor on tele to r/o cardiac involment


TTT ordered


Code(s): W19.XXXA - UNSPECIFIED FALL, INITIAL ENCOUNTER   


Qualifiers: 


   Encounter type: initial encounter   Qualified Code(s): W19.XXXA - Unspecified

fall, initial encounter   





(11) UTI (urinary tract infection)


Assessment/Plan: 


+UTI


UCx pending


c/w ceftriaxone until resulted


frequent toileting


Code(s): N39.0 - URINARY TRACT INFECTION, SITE NOT SPECIFIED   


Qualifiers: 


   Urinary tract infection type: site unspecified   Hematuria presence: with 

hematuria   Qualified Code(s): N39.0 - Urinary tract infection, site not 

specified; R31.9 - Hematuria, unspecified   





(12) Constipation


Assessment/Plan: 


retained stool seen on CT


miralax daily


Code(s): K59.00 - CONSTIPATION, UNSPECIFIED   





(13) Suspected COVID-19 virus infection


Assessment/Plan: 


COVID Suspicion low


On 2L NC


no finding on CT


strict airborne/droplet precautions until resulted











Code(s): Z20.828 - CONTACT W AND EXPOSURE TO OTH VIRAL COMMUNICABLE DISEASES   





(14) ACS (acute coronary syndrome)


Assessment/Plan: 


trop 0.37> 0.44>.39


 EKG: ST elevation in V3. TWI V5 & V6


no chest pain currently


maitian on tele 


cardioogy following


c/w asa/BB/statin


TTE ordered





Code(s): I24.9 - ACUTE ISCHEMIC HEART DISEASE, UNSPECIFIED   





(15) Weakness


Assessment/Plan: 


PT


fall precautions


lives home with wife-will discuss with family possible STR if does not improve





Code(s): R53.1 - WEAKNESS   





(16) Moderate protein-calorie malnutrition


Assessment/Plan: 


as evidenced by BMI 22, temporal and muscle wasting, generalized weakness


supplements with meals 


dietician consult [laced





Code(s): E44.0 - MODERATE PROTEIN-CALORIE MALNUTRITION   





Visit type





- Emergency Visit


Emergency Visit: Yes


ED Registration Date: 08/28/20


Care time: The patient presented to the Emergency Department on the above date 

and was hospitalized for further evaluation of their emergent condition.





- New Patient


This patient is new to me today: Yes


Date on this admission: 08/29/20





- Critical Care


Critical Care patient: No





- Discharge Referral


Referred to [facility] No





- Medication Review


Med list reviewed for High Risk Meds patients 65 and older: Yes

## 2020-08-29 NOTE — CONS
DATE OF CONSULTATION:  08/29/2020

 

REQUESTING PHYSICIAN:  Hospitalist service.

 

CHIEF COMPLAINT:  Fall, elevated troponin-I, cardiovascular evaluation.

 

History was predominantly obtained from patient's son who was contacted via

telephone.  Patient currently is confused and disoriented, 79-year-old  male

with known history of coronary artery disease, post percutaneous coronary

intervention/stenting; angina pectoris; diastolic left ventricular dysfunction with

clinical class 0 New [place] Heart Association classification left ventricular failure;

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

syndrome/early dementia; post lung surgery, partial resection, who presented to [facility] Emergency Room from his assisted living place with a fall

and inability to ambulate post fall.  Patient does not have any recollection of the

above-noted event.  It is unclear if patient had a syncopal episode or if it was a

mechanical fall.  Patient currently is resting in bed in wrist restraints.  Denies

any chest discomfort.  Denies any dyspnea.  Patient does not report any orthopnea or

paroxysmal nocturnal dyspnea.  No additional history is obtainable.

 

PAST MEDICAL HISTORY:  Coronary artery disease, post percutaneous coronary

intervention/stenting; angina pectoris; diastolic left ventricular dysfunction with

clinical class 0 New [place] Heart Association classification left ventricular failure;

hypertensive cardiovascular disease; hypercholesterolemia; organic brain

syndrome/dementia; post lung surgery, partial resection; chronic obstructive

pulmonary disease.

 

SOCIAL HISTORY:  Prior history of tobacco abuse.

 

FAMILY HISTORY:  Positive coronary artery disease.

 

ALLERGIES:  None reported.

 

MEDICAL THERAPY AT HOME:  Not known.

 

REVIEW OF SYSTEMS:  Not obtainable related to his underlying

confusion/disorientation.

 

PHYSICAL EXAMINATION:

Vital Signs:  Blood pressure is 122/61 mmHg.  Pulse rate is 84 beats per minute.

Head and Neck:  Pupils equal and reactive to light and accommodation.  Extraocular

muscles are intact.  Anicteric sclera.  Negative JVD.  No bruit appreciated.

Chest:  Diminished breath sounds at the bases bilaterally.

Cardiovascular:  S1, S2.  Regular.  Grade 2/6 systolic apical murmur.  No clicks or

gallops.

Abdomen:  Soft, benign.  Normoactive bowel sounds.

Extremities:  Negative edema.  Distal pulses 1+.

 

Chest x-ray noted.

 

Troponin-I levels were noted.  Peak was 0.44.  CBC with a white cell count 15.8,

hemoglobin 14.7, platelet count 167.  Basic metabolic profile revealed sodium 142,

potassium 4.0, BUN 20.8, creatinine 1.2, glucose 66.  .  INR 1.28.

 

ASSESSMENT:

1.  Unwitnessed fall, unclear mechanical versus a syncopal episode.

2.  Coronary artery disease, post percutaneous coronary intervention with evidence of

demand ischemic injury, angina pectoris (patient has not had coronary artery bypass

graft surgery as stated in some of the notes).

3.  Diastolic left ventricular dysfunction with clinical class 0 New [place] Heart

Association classification left ventricular failure.

4.  Hypertensive cardiovascular disease.

5.  Hypercholesterolemia.

6.  Organic brain syndrome/dementia.

7.  History of lung surgery, partial resection.

8.  History of chronic obstructive pulmonary disease as per the chart.

9.  Urinary tract infection.

10.  Chronic kidney disease.

11.  Abnormal liver function testing.

 

PLAN:

1.  Recommend the addition of beta-blocker therapy unless it is absolutely

contraindicated.  Toprol-XL at 25 mg once daily.

2.  Recommend the addition of statin therapy unless it is absolutely contraindicated.

3.  Recommend initiation of Ecotrin therapy.

4.  Antibiotics as per the primary team.

5.  Echocardiography for evaluation of left ventricular systolic function and

valvular function.

 

Planned treatment/evaluation was reviewed in detail with patient's son.

 

Thank you for your kind referral.

 

 

BARAK YANEZ M.D.

 

[id number]

DD: 08/29/2020 15:12

DT: 08/29/2020 18:46

Job #:  [id number]

## 2020-08-29 NOTE — HP
Admitting History and Physical





- Smoking History


Smoking history: Former smoker


Have you smoked in the past 12 months: No


Aproximately how many cigarettes per day: 20





- Alcohol/Substance Use


Hx Alcohol Use: No





Home Medications





- Allergies


Allergies/Adverse Reactions: 


                                    Allergies











Allergy/AdvReac Type Severity Reaction Status Date / Time


 


No Known Allergies Allergy   Verified 11/08/19 15:36














Physical Examination


Vital Signs: 


                                   Vital Signs











Temperature  98.3 F   08/29/20 04:00


 


Pulse Rate  96 H  08/29/20 06:00


 


Respiratory Rate  20   08/29/20 06:00


 


Blood Pressure  120/51 L  08/29/20 06:00


 


O2 Sat by Pulse Oximetry (%)  98   08/29/20 06:00

## 2020-08-30 LAB
ALBUMIN SERPL-MCNC: 2.9 G/DL (ref 3.4–5)
ALP SERPL-CCNC: 67 U/L (ref 45–117)
ALT SERPL-CCNC: 46 U/L (ref 13–61)
ANION GAP SERPL CALC-SCNC: 5 MMOL/L (ref 8–16)
AST SERPL-CCNC: 107 U/L (ref 15–37)
BASOPHILS # BLD: 0.3 % (ref 0–2)
BILIRUB SERPL-MCNC: 0.9 MG/DL (ref 0.2–1)
BUN SERPL-MCNC: 20.4 MG/DL (ref 7–18)
CALCIUM SERPL-MCNC: 8.4 MG/DL (ref 8.5–10.1)
CHLORIDE SERPL-SCNC: 110 MMOL/L (ref 98–107)
CO2 SERPL-SCNC: 28 MMOL/L (ref 21–32)
CREAT SERPL-MCNC: 1 MG/DL (ref 0.55–1.3)
DEPRECATED RDW RBC AUTO: 13.7 % (ref 11.9–15.9)
EOSINOPHIL # BLD: 0.1 % (ref 0–4.5)
GLUCOSE SERPL-MCNC: 91 MG/DL (ref 74–106)
HCT VFR BLD CALC: 40.9 % (ref 35.4–49)
HGB BLD-MCNC: 13.4 GM/DL (ref 11.7–16.9)
LYMPHOCYTES # BLD: 7.1 % (ref 8–40)
MAGNESIUM SERPL-MCNC: 2.1 MG/DL (ref 1.8–2.4)
MCH RBC QN AUTO: 31.9 PG (ref 25.7–33.7)
MCHC RBC AUTO-ENTMCNC: 32.9 G/DL (ref 32–35.9)
MCV RBC: 97.1 FL (ref 80–96)
MONOCYTES # BLD AUTO: 7.9 % (ref 3.8–10.2)
NEUTROPHILS # BLD: 84.6 % (ref 42.8–82.8)
PLATELET # BLD AUTO: 153 K/MM3 (ref 134–434)
PMV BLD: 8.7 FL (ref 7.5–11.1)
POTASSIUM SERPLBLD-SCNC: 4.2 MMOL/L (ref 3.5–5.1)
PROT SERPL-MCNC: 6.3 G/DL (ref 6.4–8.2)
RBC # BLD AUTO: 4.21 M/MM3 (ref 4–5.6)
SODIUM SERPL-SCNC: 143 MMOL/L (ref 136–145)
WBC # BLD AUTO: 13.8 K/MM3 (ref 4–10)

## 2020-08-30 RX ADMIN — ATORVASTATIN CALCIUM SCH MG: 40 TABLET, FILM COATED ORAL at 21:23

## 2020-08-30 RX ADMIN — CEFTRIAXONE SCH MLS/HR: 1 INJECTION, POWDER, FOR SOLUTION INTRAMUSCULAR; INTRAVENOUS at 21:23

## 2020-08-30 RX ADMIN — POLYETHYLENE GLYCOL 3350 SCH GM: 17 POWDER, FOR SOLUTION ORAL at 10:52

## 2020-08-30 RX ADMIN — ASPIRIN SCH MG: 81 TABLET, COATED ORAL at 10:52

## 2020-08-30 RX ADMIN — ENOXAPARIN SODIUM SCH MG: 40 INJECTION SUBCUTANEOUS at 10:52

## 2020-08-30 RX ADMIN — MULTIVITAMIN TABLET SCH TAB: TABLET at 10:52

## 2020-08-30 RX ADMIN — SODIUM CHLORIDE SCH MLS/HR: 9 INJECTION, SOLUTION INTRAVENOUS at 01:10

## 2020-08-30 NOTE — PN
Progress Note, Physician


History of Present Illness: 





79 y.o. 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 

was BIB his son after a fall.











- Current Medication List


Current Medications: 


Active Medications





Albuterol Sulfate (Ventolin Hfa Inhaler -)  2 puff IH Q6H PRN


   PRN Reason: SHORTNESS OF BREATH


Albuterol/Ipratropium (Duoneb -)  1 amp NEB Q6H PRN


   PRN Reason: SHORTNESS OF BREATH


Aspirin (Ecotrin -)  81 mg PO DAILY On license of [facility]


   Last Admin: 08/29/20 18:02 Dose:  81 mg


   Documented by: 


Atorvastatin Calcium (Lipitor -)  40 mg PO HS On license of [facility]


   Last Admin: 08/29/20 22:05 Dose:  40 mg


   Documented by: 


Enoxaparin Sodium (Lovenox -)  40 mg SQ DAILY On license of [facility]


   Last Admin: 08/29/20 09:46 Dose:  40 mg


   Documented by: 


Sodium Chloride (Normal Saline -)  1,000 mls @ 75 mls/hr IV ASDIR On license of [facility]


   Last Admin: 08/30/20 01:10 Dose:  75 mls/hr


   Documented by: 


Ceftriaxone Sodium 1 gm/ (Dextrose)  50 mls @ 100 mls/hr IVPB Q24H On license of [facility]; Protocol


   Last Admin: 08/29/20 22:06 Dose:  100 mls/hr


   Documented by: 


Metoprolol Succinate (Toprol Xl -)  25 mg PO DAILY On license of [facility]


   Last Admin: 08/29/20 18:02 Dose:  25 mg


   Documented by: 


Multivitamins/Minerals/Vitamin C (Tab-A-Vit -)  1 tab PO DAILY On license of [facility]


Polyethylene Glycol (Miralax (For Daily Use) -)  17 gm PO DAILY On license of [facility]











- Objective


Vital Signs: 


                                   Vital Signs











Temperature  99.2 F   08/30/20 06:00


 


Pulse Rate  76   08/30/20 06:00


 


Respiratory Rate  24 H  08/30/20 06:00


 


Blood Pressure  104/51 L  08/30/20 06:00


 


O2 Sat by Pulse Oximetry (%)  97   08/30/20 06:00











Labs: 


                                    INR, PTT











INR  1.28  (0.83-1.09)  H  08/29/20  05:40    














Problem List





- Problems


(1) COPD (chronic obstructive pulmonary disease)


Code(s): J44.9 - CHRONIC OBSTRUCTIVE PULMONARY DISEASE, UNSPECIFIED   





(2) HTN (hypertension)


Code(s): I10 - ESSENTIAL (PRIMARY) HYPERTENSION   





(3) CAD (coronary artery disease)


Code(s): I25.10 - ATHSCL HEART DISEASE OF NATIVE CORONARY ARTERY W/O ANG PCTRS  







(4) H/O pneumonectomy


Code(s): Z98.890 - OTHER SPECIFIED POSTPROCEDURAL STATES; Z90.2 - ACQUIRED 

ABSENCE OF LUNG [PART OF]   





(5) S/P CABG (coronary artery bypass graft)


Code(s): Z95.1 - PRESENCE OF AORTOCORONARY BYPASS GRAFT   





(6) Prophylactic measure


Code(s): Z29.9 - ENCOUNTER FOR PROPHYLACTIC MEASURES, UNSPECIFIED   





(7) Acute metabolic encephalopathy


Code(s): G93.41 - METABOLIC ENCEPHALOPATHY   





(8) Rhabdomyolysis


Code(s): M62.82 - RHABDOMYOLYSIS   





(9) Seborrheic keratosis


Code(s): L82.1 - OTHER SEBORRHEIC KERATOSIS   





(10) Fall


Code(s): W19.XXXA - UNSPECIFIED FALL, INITIAL ENCOUNTER   


Qualifiers: 


   Encounter type: initial encounter   Qualified Code(s): W19.XXXA - Unspecified

fall, initial encounter   





(11) UTI (urinary tract infection)


Code(s): N39.0 - URINARY TRACT INFECTION, SITE NOT SPECIFIED   


Qualifiers: 


   Urinary tract infection type: site unspecified   Hematuria presence: with 

hematuria   Qualified Code(s): N39.0 - Urinary tract infection, site not 

specified; R31.9 - Hematuria, unspecified   





(12) Constipation


Code(s): K59.00 - CONSTIPATION, UNSPECIFIED   





(13) Suspected COVID-19 virus infection


Code(s): Z20.828 - CONTACT W AND EXPOSURE TO OTH VIRAL COMMUNICABLE DISEASES   





(14) ACS (acute coronary syndrome)


Code(s): I24.9 - ACUTE ISCHEMIC HEART DISEASE, UNSPECIFIED   





(15) Weakness


Code(s): R53.1 - WEAKNESS   





(16) Moderate protein-calorie malnutrition


Code(s): E44.0 - MODERATE PROTEIN-CALORIE MALNUTRITION

## 2020-08-30 NOTE — PN
Physical Exam: 


SUBJECTIVE: Patient seen and examined, NAD











OBJECTIVE:





                                   Vital Signs











 Period  Temp  Pulse  Resp  BP Sys/Perez  Pulse Ox


 


 Last 24 Hr  99.2 F-99.5 F  68-92  22-24  /48-82  95-97











GENERAL: The patient is awake, alert, and fully oriented, in no acute distress.


HEAD: Normal with no signs of trauma.


EYES: PERRL, extraocular movements intact, sclera anicteric, conjunctiva clear. 

No ptosis. 


ENT: Ears normal, nares patent, oropharynx clear without exudates, moist mucous 


membranes.


NECK: Trachea midline, full range of motion, supple. 


LUNGS: Breath sounds equal, clear to auscultation bilaterally, no wheezes, no 

crackles, no 


accessory muscle use. 


HEART: Regular rate and rhythm, S1, S2 without murmur, rub or gallop.


ABDOMEN: Soft, nontender, nondistended, normoactive bowel sounds, no guarding, 

no 


rebound, no hepatosplenomegaly, no masses.


EXTREMITIES: 2+ pulses, warm, well-perfused, no edema. 


NEUROLOGICAL: Cranial nerves II through XII grossly intact. Normal speech, gait 

not 


observed.


PSYCH: Normal mood, normal affect.


SKIN: Warm, dry, normal turgor, no rashes or lesions noted














                         Laboratory Results - last 24 hr











  08/29/20 08/30/20 08/30/20





  08:00 07:00 07:00


 


WBC   13.8 H 


 


RBC   4.21 


 


Hgb   13.4 


 


Hct   40.9 


 


MCV   97.1 H 


 


MCH   31.9 


 


MCHC   32.9 


 


RDW   13.7 


 


Plt Count   153 


 


MPV   8.7 


 


Absolute Neuts (auto)   11.6 H 


 


Neutrophils %   84.6 H 


 


Lymphocytes %   7.1 L D 


 


Monocytes %   7.9  D 


 


Eosinophils %   0.1  D 


 


Basophils %   0.3 


 


Nucleated RBC %   0 


 


Sodium    143


 


Potassium    4.2


 


Chloride    110 H


 


Carbon Dioxide    28


 


Anion Gap    5 L


 


BUN    20.4 H


 


Creatinine    1.0


 


Est GFR (CKD-EPI)AfAm    82.02


 


Est GFR (CKD-EPI)NonAf    70.77


 


Random Glucose    91


 


Calcium    8.4 L


 


Magnesium    2.1


 


Total Bilirubin    0.9


 


AST    107 H


 


ALT    46


 


Alkaline Phosphatase    67


 


Creatine Kinase   


 


Creatine Kinase Index   


 


CK-MB (CK-2)   


 


Total Protein    6.3 L


 


Albumin    2.9 L


 


COVID-19 (DEISI)  Not detected  














  08/30/20





  07:00


 


WBC 


 


RBC 


 


Hgb 


 


Hct 


 


MCV 


 


MCH 


 


MCHC 


 


RDW 


 


Plt Count 


 


MPV 


 


Absolute Neuts (auto) 


 


Neutrophils % 


 


Lymphocytes % 


 


Monocytes % 


 


Eosinophils % 


 


Basophils % 


 


Nucleated RBC % 


 


Sodium 


 


Potassium 


 


Chloride 


 


Carbon Dioxide 


 


Anion Gap 


 


BUN 


 


Creatinine 


 


Est GFR (CKD-EPI)AfAm 


 


Est GFR (CKD-EPI)NonAf 


 


Random Glucose 


 


Calcium 


 


Magnesium 


 


Total Bilirubin 


 


AST 


 


ALT 


 


Alkaline Phosphatase 


 


Creatine Kinase  1728 H


 


Creatine Kinase Index  0.2


 


CK-MB (CK-2)  3.6


 


Total Protein 


 


Albumin 


 


COVID-19 (DEISI) 








Active Medications











Generic Name Dose Route Start Last Admin





  Trade Name Freq  PRN Reason Stop Dose Admin


 


Albuterol Sulfate  2 puff  08/29/20 16:11 





  Ventolin Hfa Inhaler -  IH  





  Q6H PRN  





  SHORTNESS OF BREATH  


 


Albuterol/Ipratropium  1 amp  08/30/20 13:05 





  Duoneb -  NEB  





  Q6H PRN  





  SHORTNESS OF BREATH  


 


Aspirin  81 mg  08/29/20 15:15  08/30/20 10:52





  Ecotrin -  PO   81 mg





  DAILY MARISSA   Administration


 


Atorvastatin Calcium  40 mg  08/29/20 22:00  08/29/20 22:05





  Lipitor -  PO   40 mg





  HS MARISSA   Administration


 


Enoxaparin Sodium  40 mg  08/29/20 10:00  08/30/20 10:52





  Lovenox -  SQ   40 mg





  DAILY MARISSA   Administration


 


Sodium Chloride  1,000 mls @ 75 mls/hr  08/29/20 00:30  08/30/20 01:10





  Normal Saline -  IV   75 mls/hr





  ASDIR MARISSA   Administration


 


Ceftriaxone Sodium 1 gm/  50 mls @ 100 mls/hr  08/29/20 22:00  08/29/20 22:06





  Dextrose  IVPB   100 mls/hr





  Q24H MARISSA   Administration





  Protocol  


 


Metoprolol Succinate  25 mg  08/29/20 15:15  08/30/20 10:52





  Toprol Xl -  PO   25 mg





  DAILY MARISSA   Administration


 


Multivitamins/Minerals/Vitamin C  1 tab  08/30/20 10:00  08/30/20 10:52





  Tab-A-Vit -  PO   1 tab





  DAILY MARISSA   Administration


 


Polyethylene Glycol  17 gm  08/30/20 10:00  08/30/20 10:52





  Miralax (For Daily Use) -  PO   17 gm





  DAILY MARISSA   Administration











ASSESSMENT/PLAN:


 Problems





#ACS (acute coronary syndrome)


Assessment/Plan: 


trop 0.37> 0.44>.39


 EKG: ST elevation in V3. TWI V5 & V6


no chest pain currently


suleman on tele 


cardioogy following


c/w asa/BB/statin


TTE ordered





Code(s): I24.9 - ACUTE ISCHEMIC HEART DISEASE, UNSPECIFIED   





# COPD (chronic obstructive pulmonary disease)


Assessment/Plan: 


Supplemental O2 to maintain SPO2 >90%


dup nebs prn


Code(s): J44.9 - CHRONIC OBSTRUCTIVE PULMONARY DISEASE, UNSPECIFIED   





# HTN (hypertension)


Assessment/Plan: 


normotensive


metoprolol as per cardiology with hold parameters


Code(s): I10 - ESSENTIAL (PRIMARY) HYPERTENSION   





# CAD (coronary artery disease)


Assessment/Plan: 


c/w asa,statin,betablocker


appreciate cardiology consultation 


Code(s): I25.10 - ATHSCL HEART DISEASE OF NATIVE CORONARY ARTERY W/O ANG PCTRS  







#H/O pneumonectomy


Code(s): Z98.890 - OTHER SPECIFIED POSTPROCEDURAL STATES; Z90.2 - ACQUIRED 

ABSENCE OF LUNG [PART OF]   





# S/P CABG (coronary artery bypass graft)


Code(s): Z95.1 - PRESENCE OF AORTOCORONARY BYPASS GRAFT   





# Prophylactic measure


Assessment/Plan: 


FEN


Fluids: adequate PO intake


Electrolytes: monitor & replete as needed


Nutrition: low sodium diet





DVT


moderate risk


sq heparin





Dispo


Maintain as inpatient


full code


discharge planning to home vs snf


Code(s): Z29.9 - ENCOUNTER FOR PROPHYLACTIC MEASURES, UNSPECIFIED   





# Acute metabolic encephalopathy


Assessment/Plan: 


multifactorial, UTI, advanced age


HCT negative for acute pathology


c/w abx


PT


supportive care





Code(s): G93.41 - METABOLIC ENCEPHALOPATHY   





# Rhabdomyolysis


Assessment/Plan: 


CK 2392>3647


c/w IVF


c/t trend


Code(s): M62.82 - RHABDOMYOLYSIS   





# Seborrheic keratosis


Code(s): L82.1 - OTHER SEBORRHEIC KERATOSIS   





# Fall


Assessment/Plan: 


PT ordered


fall precautions


monitor on tele to r/o cardiac involvement


TTT ordered


Code(s): W19.XXXA - UNSPECIFIED FALL, INITIAL ENCOUNTER   


Qualifiers: 


   Encounter type: initial encounter   Qualified Code(s): W19.XXXA - Unspecified

fall, initial encounter   





# UTI (urinary tract infection)


Assessment/Plan: 


+UTI


UCx lactose bacilli 


c/w ceftriaxone 


frequent toileting


Code(s): N39.0 - URINARY TRACT INFECTION, SITE NOT SPECIFIED   


Qualifiers: 


   Urinary tract infection type: site unspecified   Hematuria presence: with 

hematuria   Qualified Code(s): N39.0 - Urinary tract infection, site not 

specified; R31.9 - Hematuria, unspecified   





#Constipation


Assessment/Plan: 


retained stool seen on CT


miralax daily


Code(s): K59.00 - CONSTIPATION, UNSPECIFIED   





# Suspected COVID-19 virus infection


Assessment/Plan: 


COVID Suspicion low


On 2L NC


no finding on CT


strict airborne/droplet precautions until resulted


Code(s): Z20.828 - CONTACT W AND EXPOSURE TO OTH VIRAL COMMUNICABLE DISEASES   








# Weakness


Assessment/Plan: 


PT


fall precautions


lives home with wife-will discuss with family possible STR if does not improve





Code(s): R53.1 - WEAKNESS   





#Moderate protein-calorie malnutrition


Assessment/Plan: 


as evidenced by BMI 22, temporal and muscle wasting, generalized weakness


supplements with meals 


dietician consult [laced





Code(s): E44.0 - MODERATE PROTEIN-CALORIE MALNUTRITION   





Visit type





- Emergency Visit


Emergency Visit: Yes


ED Registration Date: 08/28/20


Care time: The patient presented to the Emergency Department on the above date 

and was hospitalized for further evaluation of their emergent condition.





- New Patient


This patient is new to me today: Yes


Date on this admission: 08/30/20





- Critical Care


Critical Care patient: Yes


Total Critical Care Time (in minutes): 15





- Medication Review


Med list reviewed for High Risk Meds patients 65 and older: No

## 2020-08-30 NOTE — PN
Progress Note (short form)





- Note


Progress Note: 


Chief Complaint: Events noted, notes reviewed, resting in bed, confused and 

disoriented, denies any chest discomfort, denies any dyspnea





History of Present Illness: 


Seen and examined on telemetry.  Events noted, notes reviewed, resting in bed, 

confused and disoriented, denies any chest discomfort, denies any dyspnea





Medications: 


                                        


                               Current Medications











Generic Name Dose Route Start Last Admin





  Trade Name Freq  PRN Reason Stop Dose Admin


 


Albuterol Sulfate  2 puff  08/29/20 16:11 





  Ventolin Hfa Inhaler -  IH  





  Q6H PRN  





  SHORTNESS OF BREATH  


 


Albuterol/Ipratropium  1 amp  08/29/20 15:28 





  Duoneb -  NEB  





  Q6H PRN  





  SHORTNESS OF BREATH  


 


Aspirin  81 mg  08/29/20 15:15  08/29/20 18:02





  Ecotrin -  PO   81 mg





  DAILY MARISSA   Administration


 


Atorvastatin Calcium  40 mg  08/29/20 22:00  08/29/20 22:05





  Lipitor -  PO   40 mg





  HS MARISSA   Administration


 


Enoxaparin Sodium  40 mg  08/29/20 10:00  08/29/20 09:46





  Lovenox -  SQ   40 mg





  DAILY MARISSA   Administration


 


Sodium Chloride  1,000 mls @ 75 mls/hr  08/29/20 00:30  08/30/20 01:10





  Normal Saline -  IV   75 mls/hr





  ASDIR MARISSA   Administration


 


Ceftriaxone Sodium 1 gm/  50 mls @ 100 mls/hr  08/29/20 22:00  08/29/20 22:06





  Dextrose  IVPB   100 mls/hr





  Q24H MARISSA   Administration





  Protocol  


 


Metoprolol Succinate  25 mg  08/29/20 15:15  08/29/20 18:02





  Toprol Xl -  PO   25 mg





  DAILY MARISSA   Administration


 


Multivitamins/Minerals/Vitamin C  1 tab  08/30/20 10:00 





  Tab-A-Vit -  PO  





  DAILY MARISSA  


 


Polyethylene Glycol  17 gm  08/30/20 10:00 





  Miralax (For Daily Use) -  PO  





  DAILY MARISSA  











Review of Systems





Unable to obtain/confused and disoriented





Vital Signs: 


                                        


                                Last Vital Signs











Temp Pulse Resp BP Pulse Ox


 


 99.2 F   76   24 H  104/51 L  97 


 


 08/30/20 06:00  08/30/20 06:00  08/30/20 06:00  08/30/20 06:00  08/30/20 06:00








                                 Intake & Output











 08/27/20 08/28/20 08/29/20 08/30/20





 23:59 23:59 23:59 23:59


 


Intake Total   1550 400


 


Output Total   250 


 


Balance   1300 400


 


Weight  145 lb 145 lb 0.004 oz 











Neck:  Supple Negative JVD


Respiratory:  Diminished Breath Sounds at the Bases


Cardiovascular:  S1 S2 Regular Rate Rhythm


Gastrointestinal:  Soft Benign Normal Bowel Sounds


Ext: Negative Edema  





Labs:


                                    CBC, BMP





                                 08/30/20 07:00 





                                 08/30/20 07:00 








                                        


                                  Troponin, BNP











  08/29/20





  05:40


 


Troponin I  0.39 H








                                    CBC, BMP





                                 08/29/20 05:40 





                                 08/29/20 05:40 





                                  Hepatic Panel











Total Bilirubin  1.9 mg/dL (0.2-1)  H  08/29/20  05:40    


 


Direct Bilirubin  0.5 mg/dL (0.0-0.2)  H  08/29/20  05:40    


 


AST  126 U/L (15-37)  H  08/29/20  05:40    


 


ALT  41 U/L (13-61)   08/29/20  05:40    


 


Alkaline Phosphatase  73 U/L ()   08/29/20  05:40    


 


Albumin  3.3 g/dl (3.4-5.0)  L  08/29/20  05:40    








                                    INR, PTT











INR  1.28  (0.83-1.09)  H  08/29/20  05:40    











 Assessment/Plan 





ASSESSMENT:





1.  Unwitnessed fall unclear mechanical versus a syncopal episode


2.  Coronary artery disease post percutaneous coronary intervention with 

evidence of demand ischemic injury angina pectoris (Patient has not had coronary

artery bypass graft surgery as stated in the notes)


3.  Diastolic left ventricular dysfunction with class 0 New York Heart 

Association classification left ventricular failure


4.  Hypertensive cardiovascular disease


5.  Hypercholesterolemia


6.  Organic brain syndrome/dementia


7.  History of lung surgery partial resection


8.  History of chronic obstructive pulmonary disease


9.  Urinary tract infection


10.  Chronic kidney disease


11.  Abnormal liver function testing








PLAN:





1.  Continue Toprol-XL therapy and dose titration as needed/as tolerated


2.  Recommend the addition of ACE inhibitor or angiotensin receptor blocker 

therapy unless it is absolutely contraindicated provided renal function is at 

baseline


3.  Continue Lipitor therapy


4.  Continue daily Ecotrin therapy


5.  Antibiotics as per the primary team


6.  Echocardiography for evaluation of left ventricular systolic 

function/valvular function














Devendra Miller MD

## 2020-08-31 LAB
ALBUMIN SERPL-MCNC: 2.4 G/DL (ref 3.4–5)
ALP SERPL-CCNC: 62 U/L (ref 45–117)
ALT SERPL-CCNC: 44 U/L (ref 13–61)
ANION GAP SERPL CALC-SCNC: 9 MMOL/L (ref 8–16)
AST SERPL-CCNC: 80 U/L (ref 15–37)
BASOPHILS # BLD: 0.5 % (ref 0–2)
BILIRUB SERPL-MCNC: 0.6 MG/DL (ref 0.2–1)
BUN SERPL-MCNC: 21 MG/DL (ref 7–18)
CALCIUM SERPL-MCNC: 7.7 MG/DL (ref 8.5–10.1)
CHLORIDE SERPL-SCNC: 113 MMOL/L (ref 98–107)
CO2 SERPL-SCNC: 22 MMOL/L (ref 21–32)
CREAT SERPL-MCNC: 0.8 MG/DL (ref 0.55–1.3)
DEPRECATED RDW RBC AUTO: 13.8 % (ref 11.9–15.9)
EOSINOPHIL # BLD: 0.1 % (ref 0–4.5)
GLUCOSE SERPL-MCNC: 83 MG/DL (ref 74–106)
HCT VFR BLD CALC: 36.3 % (ref 35.4–49)
HGB BLD-MCNC: 12.2 GM/DL (ref 11.7–16.9)
LYMPHOCYTES # BLD: 5.5 % (ref 8–40)
MAGNESIUM SERPL-MCNC: 1.9 MG/DL (ref 1.8–2.4)
MCH RBC QN AUTO: 32.5 PG (ref 25.7–33.7)
MCHC RBC AUTO-ENTMCNC: 33.7 G/DL (ref 32–35.9)
MCV RBC: 96.5 FL (ref 80–96)
MONOCYTES # BLD AUTO: 6.3 % (ref 3.8–10.2)
NEUTROPHILS # BLD: 87.6 % (ref 42.8–82.8)
PLATELET # BLD AUTO: 131 K/MM3 (ref 134–434)
PMV BLD: 9.1 FL (ref 7.5–11.1)
POTASSIUM SERPLBLD-SCNC: 3.4 MMOL/L (ref 3.5–5.1)
PROT SERPL-MCNC: 5.4 G/DL (ref 6.4–8.2)
RBC # BLD AUTO: 3.76 M/MM3 (ref 4–5.6)
SODIUM SERPL-SCNC: 144 MMOL/L (ref 136–145)
WBC # BLD AUTO: 9.5 K/MM3 (ref 4–10)

## 2020-08-31 RX ADMIN — ACETAMINOPHEN PRN MG: 10 INJECTION, SOLUTION INTRAVENOUS at 16:54

## 2020-08-31 RX ADMIN — ENOXAPARIN SODIUM SCH MG: 40 INJECTION SUBCUTANEOUS at 09:41

## 2020-08-31 RX ADMIN — MULTIVITAMIN TABLET SCH TAB: TABLET at 09:37

## 2020-08-31 RX ADMIN — CEFTRIAXONE SCH MLS/HR: 1 INJECTION, POWDER, FOR SOLUTION INTRAMUSCULAR; INTRAVENOUS at 22:01

## 2020-08-31 RX ADMIN — SODIUM CHLORIDE SCH MLS/HR: 9 INJECTION, SOLUTION INTRAVENOUS at 09:33

## 2020-08-31 RX ADMIN — ASPIRIN SCH MG: 81 TABLET, COATED ORAL at 09:37

## 2020-08-31 RX ADMIN — POLYETHYLENE GLYCOL 3350 SCH GM: 17 POWDER, FOR SOLUTION ORAL at 09:38

## 2020-08-31 RX ADMIN — ACETAMINOPHEN PRN MG: 10 INJECTION, SOLUTION INTRAVENOUS at 22:25

## 2020-08-31 RX ADMIN — ATORVASTATIN CALCIUM SCH MG: 40 TABLET, FILM COATED ORAL at 22:01

## 2020-08-31 NOTE — PN
Progress Note (short form)





- Note


Progress Note: 





S: Pt febrile to 100.6 overnight. Otherwise no other acute events. Pt denies any

chest pain or discomfort currently. Echo to be performed today. Episodes of 

physiologic bradycardia overnight while sleeping. 





O:


                                   Vital Signs











Temperature  98.6 F   08/31/20 06:00


 


Pulse Rate  61   08/31/20 06:00


 


Respiratory Rate  18   08/31/20 06:00


 


Blood Pressure  96/66   08/31/20 06:00


 


O2 Sat by Pulse Oximetry (%)  99   08/30/20 22:00








PE:


Gen: NAD, awake, alert, slight pallor noted


HEENT: Nc/AT, glasses intact, EOMI, NELLY, dry mucosa


Neck: No JVD, no carotid bruits


LUNG: CTA b/l without wheezes or rales, on RA 99%


CARD: RRR, LUSB 2/6 systolic murmur noted without radiation. S1 and S2 present


ABD: Soft, NT/ND, normoactive BS, no guarding, no masses appreciated


EXT: No edema noted, strong intact pulses b/l





                                    CBC, BMP





                                 08/31/20 06:10 





                                 08/31/20 06:10 





                                  Microbiology





08/28/20 21:00   Blood - Peripheral Venous   Blood Culture - Preliminary


                            NO GROWTH OBTAINED AFTER 48 HOURS, INCUBATION TO 

CONTINUE


                            FOR 3 DAYS.


08/28/20 21:00   Blood - Peripheral Venous   Blood Culture - Preliminary


                            NO GROWTH OBTAINED AFTER 48 HOURS, INCUBATION TO 

CONTINUE


                            FOR 3 DAYS.


08/28/20 21:10   Urine - Urine Clean Catch   Urine Culture - Preliminary


                            Lactose Fermenting Neg Bacilli





A/P:


Uncomplicated cystitis


Acute coronary syndrome


Mechanical fall 


Rhabdomyolysis


Hypokalemia


Protein Malnutrition





--Patient on day 3 of Rocephin; fever noted last night


   --If continue to be febrile will investigate other sources


--Await sensitivities of urine culture; Bcx without growth


--Can switch to oral ABX for total 5 days if remains afebrile and once 

sensitivities return





--ACS noted with acute ECG changes (V3 isolated ST elevation with TWI in V5-6)


--Cardiology on board


--Would ideally benefit from ACEi or ARB therapy, however BP has been low 

normals and would risk becoming hypotensive


--Continue Toprol XL


--Echocardiogram to be performed today


--Continue telemetry monitoring





--Noted mechanical fall, however investigation into cardiac etiology


--Fall precuations


--PT assessment today


--Discussed prior with family member: would like to pursue SNF for STR if 

indicated





--Downtrending CK


--Continue gentle IVF (NS@75cc/hr)


--repeat CK today





FEN:


   Fluids: as above


   Electrolyte abnormalities: Hypokalemia (repleted kdur 40mEq)


   Nutrition: Ensure supplementation for calorie malnutrition; sodium controlled

diet





PPX:


   DVT - Lovenox 40 SQ





Dispo: Continue telemetry monitoring; plan as above


DO Adeola Brewer

## 2020-08-31 NOTE — PN
Progress Note, Physician


History of Present Illness: 


No events on telemetry, denies recurrent near or true syncope.








- Current Medication List


Current Medications: 


Active Medications





Albuterol Sulfate (Ventolin Hfa Inhaler -)  2 puff IH Q6H PRN


   PRN Reason: SHORTNESS OF BREATH


Albuterol/Ipratropium (Duoneb -)  1 amp NEB Q6H PRN


   PRN Reason: SHORTNESS OF BREATH


Aspirin (Ecotrin -)  81 mg PO DAILY [facility]


   Last Admin: 08/30/20 10:52 Dose:  81 mg


   Documented by: 


Atorvastatin Calcium (Lipitor -)  40 mg PO HS [facility]


   Last Admin: 08/30/20 21:23 Dose:  40 mg


   Documented by: 


Enoxaparin Sodium (Lovenox -)  40 mg SQ DAILY [facility]


   Last Admin: 08/30/20 10:52 Dose:  40 mg


   Documented by: 


Sodium Chloride (Normal Saline -)  1,000 mls @ 75 mls/hr IV ASDIR [facility]


   Last Admin: 08/30/20 01:10 Dose:  75 mls/hr


   Documented by: 


Ceftriaxone Sodium 1 gm/ (Dextrose)  50 mls @ 100 mls/hr IVPB Q24H [facility]; Protocol


   Last Admin: 08/30/20 21:23 Dose:  100 mls/hr


   Documented by: 


Metoprolol Succinate (Toprol Xl -)  25 mg PO DAILY [facility]


   Last Admin: 08/30/20 10:52 Dose:  25 mg


   Documented by: 


Multivitamins/Minerals/Vitamin C (Tab-A-Vit -)  1 tab PO DAILY [facility]


   Last Admin: 08/30/20 10:52 Dose:  1 tab


   Documented by: 


Polyethylene Glycol (Miralax (For Daily Use) -)  17 gm PO DAILY [facility]


   Last Admin: 08/30/20 10:52 Dose:  17 gm


   Documented by: 











- Objective


Vital Signs: 


                                   Vital Signs











Temperature  98.0 F   08/31/20 08:20


 


Pulse Rate  61   08/31/20 06:00


 


Respiratory Rate  18   08/31/20 06:00


 


Blood Pressure  96/66   08/31/20 06:00


 


O2 Sat by Pulse Oximetry (%)  95   08/31/20 08:14











Constitutional: Yes: No Distress, Calm


Neck: Yes: Supple


Cardiovascular: Yes: Regular Rate and Rhythm


Respiratory: Yes: Regular, CTA Bilaterally, On Nasal O2


Gastrointestinal: Yes: Normal Bowel Sounds, Soft, Abdomen, Obese


Edema: No


Labs: 


                                    CBC, BMP





                                 08/31/20 06:10 





                                 08/31/20 06:10 





                                    INR, PTT











INR  1.28  (0.83-1.09)  H  08/29/20  05:40    














- ....Imaging


EKG: Report Reviewed (Tele: No sig pauses or bradyarrhythmia)





Problem List





- Problems


(1) CAD (coronary artery disease)


Code(s): I25.10 - ATHSCL HEART DISEASE OF NATIVE CORONARY ARTERY W/O ANG PCTRS  




Qualifiers: 


   Coronary Disease-Associated Artery/Lesion type: native artery   Native vs. 

transplanted heart: native heart   Associated angina: without angina   Qualified

Code(s): I25.10 - Atherosclerotic heart disease of native coronary artery 

without angina pectoris   





(2) COPD (chronic obstructive pulmonary disease)


Code(s): J44.9 - CHRONIC OBSTRUCTIVE PULMONARY DISEASE, UNSPECIFIED   


Qualifiers: 


   COPD type: unspecified COPD   Qualified Code(s): J44.9 - Chronic obstructive 

pulmonary disease, unspecified   





(3) HTN (hypertension)


Code(s): I10 - ESSENTIAL (PRIMARY) HYPERTENSION   


Qualifiers: 


   Hypertension type: essential hypertension   Qualified Code(s): I10 - 

Essential (primary) hypertension   





(4) Diastolic dysfunction


Code(s): I51.89 - OTHER ILL-DEFINED HEART DISEASES   





(5) Demand ischemia


Code(s): I24.8 - OTHER FORMS OF ACUTE ISCHEMIC HEART DISEASE   





(6) Hyperlipidemia


Code(s): E78.5 - HYPERLIPIDEMIA, UNSPECIFIED   


Qualifiers: 


   Hyperlipidemia type: pure hypercholesterolemia   Qualified Code(s): E78.00 - 

Pure hypercholesterolemia, unspecified; E78.0 - Pure hypercholesterolemia   





Assessment/Plan








1.  Unwitnessed fall unclear mechanical versus a syncopal episode


2.  Coronary artery disease post percutaneous coronary intervention with 

evidence of demand ischemic injury angina pectoris (Patient has not had coronary

artery bypass graft surgery as stated in the notes)


3.  Diastolic left ventricular dysfunction with class 0 New York Heart 

Association classification left ventricular failure


4.  Hypertensive cardiovascular disease


5.  Hypercholesterolemia


6.  Organic brain syndrome/dementia


7.  History of lung surgery partial resection


8.  History of chronic obstructive pulmonary disease


9.  Urinary tract infection


10.  Chronic kidney disease


11.  Abnormal liver function testing


12. Fever on empiric abx





PLAN:





1.  Continue Toprol-XL 25 qd and dose titration as needed/as tolerated, 

troponins have plateaued


2.  Recommend the addition of ACE inhibitor or angiotensin receptor blocker 

therapy unless it is absolutely contraindicated provided renal function is at 

baseline


3.  Continue Lipitor 40 qd


4.  Continue daily Ecotrin 81 qd, replete K


5.  Antibiotics as per the primary team


6.  Echocardiography for evaluation of left ventricular systolic functio

n/valvular function


7.  PT->gait training

## 2020-08-31 NOTE — ECHO
______________________________________________________________________________



Name: ANGELA CHAYITO                                   Exam:Adult Echocardiogram

MRN: [id number]         Study Date: 2020 10:05 AM

Age: 79 yrs

______________________________________________________________________________



Reason For Study: ISCHEMIA

Height: 67 in        Weight: 145 lb        BSA: 1.8 m2



______________________________________________________________________________



MMode/2D Measurements & Calculations

IVSd: 1.1 cm                                            Ao root diam: 3.7 cm

LVIDd: 3.9 cm                                           LA dimension: 2.6 cm

LVIDs: 2.6 cm

LVPWd: 1.0 cm



_________________________________________________________

EDV(Teich): 65.7 ml                                     LVOT diam: 2.0 cm

ESV(Teich): 24.3 ml



_________________________________________________________

LAV (MOD-bp): 37.3 ml



Doppler Measurements & Calculations

MV E max sin: 55.5 cm/sec                            Ao V2 max: 78.5 cm/sec

MV A max sin: 84.6 cm/sec                            Ao max P.5 mmHg

MV E/A: 0.66

MV dec time: 0.23 sec                                HUY(V,D): 2.3 cm2



______________________________________________________

LV V1 max P.3 mmHg                               TR max sin: 228.0 cm/sec

LV V1 max: 58.0 cm/sec                               TR max P.0 mmHg



______________________________________________________

PA V2 max: 96.9 cm/sec                               Med Peak E' Sin: 5.0 cm/sec

PA max PG: 3.8 mmHg                                  Med E/e': 11.1

                                                     Lat Peak E' Sin: 6.3 cm/sec

                                                     Lat E/e': 8.8

______________________________________________________



PI Vmax: 132.0 cm/sec



______________________________________________________________________________



Procedure

A complete two-dimensional transthoracic echocardiogram was performed (2D, M-mode, Doppler and color 
flow

Doppler).

Left Ventricle

The left ventricle is normal in size. Left ventricular systolic function is normal. Ejection Fraction
 = 60-

65%. Grade I diastolic dysfunction, (abnormal relaxation pattern). No regional wall motion abnormalit
ies

noted.

Right Ventricle

The right ventricle is normal size. The right ventricular systolic function is normal.

Atria

The left atrial size is normal. Right atrial size is normal.

Mitral Valve

There is mild mitral annular calcification. There is mild mitral regurgitation.

Tricuspid Valve

The tricuspid valve is normal in structure and function. There is mild tricuspid regurgitation. PASP 
is at

least 29 mmHg if RA pressure is assumed 3 mmHg.

Aortic Valve

There is mild aortic sclerosis.;. No aortic regurgitation is present.

Pulmonic Valve

The pulmonic valve is not well visualized.

Great Vessels

The aortic root is normal size.

Pericardium/Pleura

There is no pericardial effusion.

______________________________________________________________________________





Interpretation Summary

The left ventricle is normal in size.

Left ventricular systolic function is normal.

No regional wall motion abnormalities noted.

Ejection Fraction = 60-65%.

Grade I diastolic dysfunction, (abnormal relaxation pattern).

There is mild mitral annular calcification.

There is mild mitral regurgitation.

There is mild tricuspid regurgitation.

PASP is at least 29 mmHg if RA pressure is assumed 3 mmHg

There is mild aortic sclerosis.

There is no pericardial effusion.





Jordy Avelar MD 2020 03:26 PM

## 2020-09-01 LAB
ALBUMIN SERPL-MCNC: 2.3 G/DL (ref 3.4–5)
ALP SERPL-CCNC: 123 U/L (ref 45–117)
ALT SERPL-CCNC: 105 U/L (ref 13–61)
ANION GAP SERPL CALC-SCNC: 6 MMOL/L (ref 8–16)
AST SERPL-CCNC: 138 U/L (ref 15–37)
BASOPHILS # BLD: 0.2 % (ref 0–2)
BILIRUB SERPL-MCNC: 0.6 MG/DL (ref 0.2–1)
BUN SERPL-MCNC: 19 MG/DL (ref 7–18)
CALCIUM SERPL-MCNC: 7.5 MG/DL (ref 8.5–10.1)
CHLORIDE SERPL-SCNC: 113 MMOL/L (ref 98–107)
CO2 SERPL-SCNC: 25 MMOL/L (ref 21–32)
CREAT SERPL-MCNC: 0.9 MG/DL (ref 0.55–1.3)
DEPRECATED RDW RBC AUTO: 14 % (ref 11.9–15.9)
EOSINOPHIL # BLD: 0 % (ref 0–4.5)
GLUCOSE SERPL-MCNC: 105 MG/DL (ref 74–106)
HCT VFR BLD CALC: 36.6 % (ref 35.4–49)
HGB BLD-MCNC: 11.9 GM/DL (ref 11.7–16.9)
LYMPHOCYTES # BLD: 4 % (ref 8–40)
MAGNESIUM SERPL-MCNC: 1.9 MG/DL (ref 1.8–2.4)
MCH RBC QN AUTO: 31.7 PG (ref 25.7–33.7)
MCHC RBC AUTO-ENTMCNC: 32.5 G/DL (ref 32–35.9)
MCV RBC: 97.5 FL (ref 80–96)
MONOCYTES # BLD AUTO: 6.2 % (ref 3.8–10.2)
NEUTROPHILS # BLD: 89.6 % (ref 42.8–82.8)
PLATELET # BLD AUTO: 125 K/MM3 (ref 134–434)
PMV BLD: 8.8 FL (ref 7.5–11.1)
POTASSIUM SERPLBLD-SCNC: 4.1 MMOL/L (ref 3.5–5.1)
PROT SERPL-MCNC: 5.2 G/DL (ref 6.4–8.2)
RBC # BLD AUTO: 3.76 M/MM3 (ref 4–5.6)
SODIUM SERPL-SCNC: 144 MMOL/L (ref 136–145)
WBC # BLD AUTO: 14.8 K/MM3 (ref 4–10)

## 2020-09-01 RX ADMIN — ENOXAPARIN SODIUM SCH MG: 40 INJECTION SUBCUTANEOUS at 09:11

## 2020-09-01 RX ADMIN — POLYETHYLENE GLYCOL 3350 SCH GM: 17 POWDER, FOR SOLUTION ORAL at 09:12

## 2020-09-01 RX ADMIN — MULTIVITAMIN TABLET SCH TAB: TABLET at 09:11

## 2020-09-01 RX ADMIN — ASPIRIN SCH MG: 81 TABLET, COATED ORAL at 09:11

## 2020-09-01 RX ADMIN — SODIUM CHLORIDE SCH: 9 INJECTION, SOLUTION INTRAVENOUS at 23:48

## 2020-09-01 NOTE — CONSULT
Admitting History and Physical





- Admission


History of Present Illness: 





80 y/o male who was BIB his son after mechanical fall, head trauma.





Pt presents with toxic metabolic encephalopathy, moderate protein calorie 

malnutrition. 





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 


                                Selected Entries











  08/29/20 08/31/20 09/01/20





  15:38 18:00 01:32


 


Breakfast NPO  


 


Diet Tolerated  Fair 


 


Lunch 75%  


 


Supper  50% 


 


Temperature   98.4 F


 


Pulse Rate   83


 


Blood Pressure   104/48 L


 


O2 Sat by Pulse   





Oximetry (%)   


 


Oxygen Delivery   





Method   














  09/01/20 09/01/20 09/01/20





  06:00 09:00 11:57


 


Breakfast   0


 


Diet Tolerated   Refused


 


Lunch   


 


Supper   


 


Temperature 97.2 F L 97.9 F 


 


Pulse Rate 76 83 


 


Blood Pressure 118/59 L 153/77 


 


O2 Sat by Pulse  98 





Oximetry (%)   


 


Oxygen Delivery  Room Air 





Method   














  09/01/20





  13:00


 


Breakfast 


 


Diet Tolerated 


 


Lunch 


 


Supper 


 


Temperature 98.1 F


 


Pulse Rate 94 H


 


Blood Pressure 155/87


 


O2 Sat by Pulse 94 L





Oximetry (%) 


 


Oxygen Delivery 





Method 








                                Laboratory Tests











  08/28/20 08/29/20 08/30/20





  21:00 05:40 07:00


 


WBC  19.6 H  16.8 H  13.8 H














  08/31/20 09/01/20





  06:10 05:40


 


WBC  9.5  14.8 H





                                Laboratory Tests











  08/29/20





  08:00


 


COVID-19 (DEISI)  Not detected











On soft/reg diet/thin liquid





Edentulous.





Pt is verbal, friendly and engaging, disoriented to age, place, date. Thinks he 

i8s on [place] [employer] and wants to know how I knew to visit him here. 





He reports feeling congested and seems SOB/anxious. 





History Source: Medical Record


Limitations to Obtaining History: Clinical Condition





- Smoking History


Smoking history: Former smoker


Have you smoked in the past 12 months: No


Aproximately how many cigarettes per day: 20





- Alcohol/Substance Use


Hx Alcohol Use: No





- Social History


Occupation: [employer]





History





- Admission


Reason For Visit: UTI,ELEVATED TROPONIN LEVEL,FALL





- Diagnostics


X-ray: Report Reviewed


CT Scan: Report Reviewed





- General


Mental Status: Awake and Alert, Able to Follow Commands, Forgetful, Confused


Attention: Distractible


Ability to Follow Directions: Good


Head/Neck Control: Good





- Hearing


Hearing: Functional





Speech Evaluation





- Communication


Primary Language: ENGLISH


Communication: Yes: Within Normal Limits


Oral Expression Ability: Yes: No Impairment





- Speech Production


Able to Make Needs Known: Yes: WNL


Intelligibility: Yes: WNL





- Speech Characteristics


Voice Loudness: Normal


Voice Pitch: Yes: Normal


Voice Phonatory-based Quality: Yes: Normal


Speech Pattern: Normal


Speech Clarity: < 100%


Nasal Resonance: Normal


Articulation: Yes: Precise


Rate of Speech: Intact





- Language/Auditory Comprehension


Follows: Yes: 2 Stage Simple Commands


Observation: Able to respond to yes/no queries: Yes, Yes/No Confusion: No, 

Comprehends Conversational Speech: Yes





- Language/Verbal Expression


Able to Respond to Simple Queries: Yes: WNL


Able to Communicate Wants and Needs: Yes: WNL


Functional Communication Status: Yes: WNL





- Swallow Evaluation/Bedside Assessment


Current Nutritional Intake: Regular, Thin Liquids


Oral Secretions: Yes: WFL


Dentition: Yes: Edentulous (Says his dentures are not here)


Facial Symmetry at Rest: Symmetrical


Facial Symmetry on Retraction: Symmetrical


Facial Movement: Controlled


Against Resistance Opening: Normal


Against Resistance Closing: Normal


Pucker Lips: Normal


Smile: Normal


Lingual Movement: Normal, Symmetric


Lingual Speed of Movement: Normal


Lingual Movement Strgth Against Opposition: Normal


Lingual Movement Characteristics: Normal


Laryngeal Movement: Able to Palpate, Labored,delay initiation (very delayed, 

often not triggered. Needs repeated cues to swallow. Swallow seems brisk once 

tiggered)


Rate of Intake: Slow/Holding


Chewing: Impaired


Oral Prep Time: Increased


A-P Transit: Impaired


Timing of Swallow: Delayed (very. needs cues to swallow)


Coughing/Throat Clear: No (says it "went down wrong" no cough, sob)





Recommendations





- Speech Evaluation, Impression/Plan


Impression: Swalow very delayed, often not triggered. Needs repeated cues to 

swallow. Swallow seems brisk once tiggered.  On reg diet. Edentulous.  No 

responsive cough while drinking, however, sudden eye opening, says it "went down

wrong".  Suspect oropharyngeal dyscoordination-Silent aspiration? SOB





- Disposition


Discharge to: Skilled Nursing Facility, To be Determined





- Dysphagia Impressions/Plan


Swallowing Skills: Impaired


Dysphagia Impressions: Risk of Aspiration, Ongoing Evaluation, Suspect Aspiratio

n


*Silent aspiration: cannot be R/O at bedside


Dysphagia Treatment Plan: Small Bites, Chin Tuck/Down, Clear Pocket Food, 

Facilitative Feeding, Safe Rate, 1/2 tsp. at a time, Elevate HOB during feed, 

Other (tell pt to swallow, palpate larynx for reflex)


Recommendations: Modified Barium Swallow





- Recommendations


Diet Consistency: Dysphagia Pureed


Medication Administration: Crushed with applesauce


Liquids: Nectar Thick


Supplement: Magic Cup, Ensure Pudding, Other (2 Reina HN)

## 2020-09-01 NOTE — CON.ID
Consult


Consult Specialty:: infectious diseases


Referred by:: marshall


Reason for Consultation:: uti,weakness





- History of Present Illness


Chief Complaint: weakness


History of Present Illness: 





79 y.o. 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 

was BIB his son after a fall. . Patient says he was sleeping and woke up to use 

the bathroom. When he tried to stand up he fell to the ground and wasn't able to

stand up. He was able to call for help and was brought to the ED.  patients 

mental status is altered from his baseline (normally aox3, complete sentences). 

According to the son, the pt's memory has been progressively declining over the 

past 6 months, however since the fall today he has noticed a rapid decline in 

mental status. At home he ambulates with a cane and is mobile on his own.


the above history was obtained from the charts as patient is unable to give 

history








- History Source


History Provided By: Patient, Medical Record


Limitations to Obtaining History: Dementia





- Alcohol/Substance Use


Hx Alcohol Use: No





- Smoking History


Smoking history: Former smoker


Have you smoked in the past 12 months: No


Aproximately how many cigarettes per day: 20





Home Medications





- Allergies


Allergies/Adverse Reactions: 


                                    Allergies











Allergy/AdvReac Type Severity Reaction Status Date / Time


 


No Known Allergies Allergy   Verified 11/08/19 15:36














Review of Systems


Unable to obtain ROS, reason: unable to obtain





Physical Exam


Vital Signs: 


                                   Vital Signs











Temperature  98.1 F   09/01/20 13:00


 


Pulse Rate  94 H  09/01/20 13:00


 


Respiratory Rate  20   09/01/20 13:00


 


Blood Pressure  155/87   09/01/20 13:00


 


O2 Sat by Pulse Oximetry (%)  94 L  09/01/20 13:00











Constitutional: Yes: No Distress, Calm, Thin


Eyes: Yes: Conjunctiva Clear


HENT: Yes: Atraumatic, Normocephalic


Neck: Yes: Supple, Trachea Midline


Cardiovascular: Yes: Regular Rate and Rhythm


Respiratory: Yes: Regular, CTA Bilaterally


Gastrointestinal: Yes: Normal Bowel Sounds, Soft


Musculoskeletal: Yes: WNL


Extremities: Yes: WNL


Neurological: Yes: Alert, Confusion


Labs: 


                                    CBC, BMP





                                 09/01/20 05:40 





                                 09/01/20 05:40 











Imaging





- Results


Chest X-ray: Report Reviewed, Image Reviewed


Cat Scan: Report Reviewed, Image Reviewed





Assessment/Plan





 Problem List 





- Problems


(1) Acute metabolic encephalopathy


Code(s): G93.41 - METABOLIC ENCEPHALOPATHY   





(2) CAD (coronary artery disease)


Code(s): I25.10 - ATHSCL HEART DISEASE OF NATIVE CORONARY ARTERY W/O ANG PCTRS  




Qualifiers: 


   Coronary Disease-Associated Artery/Lesion type: native artery   Native vs. 

transplanted heart: native heart   Associated angina: without angina   Qualified

Code(s): I25.10 - Atherosclerotic heart disease of native coronary artery 

without angina pectoris   





(3) COPD (chronic obstructive pulmonary disease)


Code(s): J44.9 - CHRONIC OBSTRUCTIVE PULMONARY DISEASE, UNSPECIFIED   


Qualifiers: 


   COPD type: unspecified COPD   Qualified Code(s): J44.9 - Chronic obstructive 

pulmonary disease, unspecified   





(4) Demand ischemia


Code(s): I24.8 - OTHER FORMS OF ACUTE ISCHEMIC HEART DISEASE   





(5) Diastolic dysfunction


Code(s): I51.89 - OTHER ILL-DEFINED HEART DISEASES   





(6) Fall


Code(s): W19.XXXA - UNSPECIFIED FALL, INITIAL ENCOUNTER   





(7) HTN (hypertension)


Code(s): I10 - ESSENTIAL (PRIMARY) HYPERTENSION   


Qualifiers: 


   Hypertension type: essential hypertension   Qualified Code(s): I10 - 

Essential (primary) hypertension   





(8) Hyperlipidemia


Code(s): E78.5 - HYPERLIPIDEMIA, UNSPECIFIED   


Qualifiers: 


   Hyperlipidemia type: pure hypercholesterolemia   Qualified Code(s): E78.00 - 

Pure hypercholesterolemia, unspecified; E78.0 - Pure hypercholesterolemia   





(9) UTI (urinary tract infection)


Code(s): N39.0 - URINARY TRACT INFECTION, SITE NOT SPECIFIED   


Qualifiers: 


   Urinary tract infection type: site unspecified   Hematuria presence: with 

hematuria   Qualified Code(s): N39.0 - Urinary tract infection, site not 

specified; R31.9 - Hematuria, unspecified   





(10) Weakness


Code(s): R53.1 - WEAKNESS   








plan


patient started on abx


will see how he does


nutrition


asp precautions

## 2020-09-01 NOTE — PN
Progress Note (short form)





- Note


Progress Note: 


Chief Complaint: Events noted, notes reviewed, resting in bed, remains confused 

and disoriented, denies any chest discomfort, denies any dyspnea





History of Present Illness: 


Seen and examined on telemetry.  Events noted, notes reviewed, resting in bed, 

remains confused and disoriented, denies any chest discomfort, denies any 

dyspnea





Echocardiography revealed normal left ventricular size and systolic function 

with estimated LVEF between 60-65%, grade I diastolic dysfunction, mild mitral 

valve regurgitation, mild tricuspid valve regurgitation





Medications: 


                                        


                               Current Medications











Generic Name Dose Route Start Last Admin





  Trade Name Freq  PRN Reason Stop Dose Admin


 


Acetaminophen  1,000 mg  08/31/20 16:30  08/31/20 22:25





  Ofirmev Injection -  IVPB  09/01/20 16:30  1,000 mg





  Q6H PRN   Administration





  PAIN LEVEL 6-10  


 


Albuterol Sulfate  2 puff  08/29/20 16:11 





  Ventolin Hfa Inhaler -  IH  





  Q6H PRN  





  SHORTNESS OF BREATH  


 


Albuterol/Ipratropium  1 amp  08/30/20 13:05 





  Duoneb -  NEB  





  Q6H PRN  





  SHORTNESS OF BREATH  


 


Aspirin  81 mg  08/29/20 15:15  08/31/20 09:37





  Ecotrin -  PO   81 mg





  DAILY MARISSA   Administration


 


Atorvastatin Calcium  40 mg  08/29/20 22:00  08/31/20 22:01





  Lipitor -  PO   40 mg





  HS MARISSA   Administration


 


Enoxaparin Sodium  40 mg  08/29/20 10:00  08/31/20 09:41





  Lovenox -  SQ   40 mg





  DAILY MARISSA   Administration


 


Sodium Chloride  1,000 mls @ 75 mls/hr  08/29/20 00:30  08/31/20 09:33





  Normal Saline -  IV   75 mls/hr





  ASDIR MARISSA   Administration


 


Ceftriaxone Sodium 1 gm/  50 mls @ 100 mls/hr  08/29/20 22:00  08/31/20 22:01





  Dextrose  IVPB   100 mls/hr





  Q24H MARISSA   Administration





  Protocol  


 


Metoprolol Succinate  25 mg  08/29/20 15:15  08/31/20 09:37





  Toprol Xl -  PO   25 mg





  DAILY MARISSA   Administration


 


Multivitamins/Minerals/Vitamin C  1 tab  08/30/20 10:00  08/31/20 09:37





  Tab-A-Vit -  PO   1 tab





  DAILY MARISSA   Administration


 


Polyethylene Glycol  17 gm  08/30/20 10:00  08/31/20 09:38





  Miralax (For Daily Use) -  PO   17 gm





  DAILY MARISSA   Administration











Review of Systems





Unable to obtain/confused and disoriented





Vital Signs: 


                                        


                                Last Vital Signs











Temp Pulse Resp BP Pulse Ox


 


 97.2 F L  76   20   118/59 L  98 


 


 09/01/20 06:00  09/01/20 06:00  09/01/20 06:00  09/01/20 06:00  08/31/20 22:00








                                 Intake & Output











 08/29/20 08/30/20 08/31/20 09/01/20





 23:59 23:59 23:59 23:59


 


Intake Total 1550 400 200 


 


Output Total 250   


 


Balance 1300 400 200 


 


Weight 145 lb 0.004 oz 145 lb  











Neck:  Supple Negative JVD


Respiratory:  Diminished Breath Sounds at the Bases


Cardiovascular:  S1 S2 Regular Rate Rhythm


Gastrointestinal:  Soft Benign Normal Bowel Sounds


Ext: Negative Edema  





Labs:


                                    CBC, BMP





                                 09/01/20 05:40 





                                 09/01/20 05:40 








                                    CBC, BMP





                                 08/31/20 06:10 





                                 08/31/20 06:10 





                                  Hepatic Panel











Total Bilirubin  0.6 mg/dL (0.2-1)   08/31/20  06:10    


 


Direct Bilirubin  0.5 mg/dL (0.0-0.2)  H  08/29/20  05:40    


 


AST  80 U/L (15-37)  H  08/31/20  06:10    


 


ALT  44 U/L (13-61)   08/31/20  06:10    


 


Alkaline Phosphatase  62 U/L ()   08/31/20  06:10    


 


Albumin  2.4 g/dl (3.4-5.0)  L  08/31/20  06:10    








                                    INR, PTT











INR  1.28  (0.83-1.09)  H  08/29/20  05:40    











 Assessment/Plan 





ASSESSMENT:





1.  Unwitnessed fall unclear mechanical versus a syncopal episode


2.  Coronary artery disease post percutaneous coronary intervention with eloina

dence of demand ischemic injury angina pectoris (Patient has not had coronary 

artery bypass graft surgery as stated in the notes)


3.  Diastolic left ventricular dysfunction with class 0 New York Heart 

Association classification left ventricular failure


4.  Hypertensive cardiovascular disease


5.  Hypercholesterolemia


6.  Organic brain syndrome/dementia- persistent confusion/disorientation


7.  History of lung surgery partial resection


8.  History of chronic obstructive pulmonary disease


9.  Urinary tract infection


10.  Chronic kidney disease


11.  Abnormal liver function testing








PLAN:





1.  Continue Toprol-XL therapy and dose titration as needed/as tolerated


2.  Recommend the addition of ACE inhibitor or angiotensin receptor blocker 

therapy unless it is absolutely contraindicated provided renal function is at 

baseline


3.  Continue Lipitor therapy


4.  Continue daily Ecotrin therapy


5.  Antibiotics as per the primary team














Devendra Miller MD

## 2020-09-01 NOTE — PN
Physical Exam: 


SUBJECTIVE: Patient seen and examined at the bedside.  In no acute distress.  

patient non ambulating well with physical therapy at this time. 





OBJECTIVE:


Patient is an 79 year old male with a significant past medical history of COPD, 

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

a fall.  on admission patient also noed to have toxic metabolic encephalopathy 

with moderate protein calorie malnutrition. 





swallow eval ordered for increased leukocytosis.


                                   Vital Signs











 Period  Temp  Pulse  Resp  BP Sys/Perez  Pulse Ox


 


 Last 24 Hr  97.2 F-98.4 F  65-83  20-20  104-153/48-77  95-98








GENERAL: The patient is awake, forgetful, confused


HEAD: Normal with no signs of trauma.


EYES: PERRL, extraocular movements intact, sclera anicteric, conjunctiva clear. 

No ptosis. 


ENT: Ears normal, nares patent, oropharynx clear without exudates 


NECK: Trachea midline, full range of motion, supple. 


LUNGS: mild wheezing on anterior lobes, diminished posteriorly


HEART: Regular rate and rhythm 


ABDOMEN: Soft, nontender, nondistended, normoactive bowel sounds 


EXTREMITIES:   no edema. 


NEUROLOGICAL:  Normal speech, gait not observed.


PSYCH: Normal mood, normal affect.


Laboratory Results - last 24 hr











  08/31/20 09/01/20 09/01/20





  11:37 05:40 05:40


 


WBC   14.8 H 


 


RBC   3.76 L 


 


Hgb   11.9 


 


Hct   36.6 


 


MCV   97.5 H 


 


MCH   31.7 


 


MCHC   32.5 


 


RDW   14.0 


 


Plt Count   125 L 


 


MPV   8.8 


 


Absolute Neuts (auto)   13.3 H 


 


Neutrophils %   89.6 H 


 


Lymphocytes %   4.0 L D 


 


Monocytes %   6.2 


 


Eosinophils %   0.0  D 


 


Basophils %   0.2 


 


Nucleated RBC %   0 


 


Sodium    144


 


Potassium    4.1


 


Chloride    113 H


 


Carbon Dioxide    25


 


Anion Gap    6 L


 


BUN    19.0 H


 


Creatinine    0.9


 


Est GFR (CKD-EPI)AfAm    93.16


 


Est GFR (CKD-EPI)NonAf    80.38


 


Random Glucose    105


 


Calcium    7.5 L


 


Magnesium    1.9


 


Total Bilirubin    0.6


 


AST    138 H


 


ALT    105 H


 


Alkaline Phosphatase    123 H


 


Creatine Kinase  925 H   807 H


 


Creatine Kinase Index  0.2   0.4


 


CK-MB (CK-2)  2.4   4.0 H


 


Total Protein    5.2 L


 


Albumin    2.3 L








Active Medications











Generic Name Dose Route Start Last Admin





  Trade Name Curtis  PRN Reason Stop Dose Admin


 


Acetaminophen  1,000 mg  08/31/20 16:30  08/31/20 22:25





  Ofirmev Injection -  IVPB  09/01/20 16:30  1,000 mg





  Q6H PRN   Administration





  PAIN LEVEL 6-10  


 


Albuterol Sulfate  2 puff  08/29/20 16:11 





  Ventolin Hfa Inhaler -  IH  





  Q6H PRN  





  SHORTNESS OF BREATH  


 


Albuterol/Ipratropium  1 amp  08/30/20 13:05 





  Duoneb -  NEB  





  Q6H PRN  





  SHORTNESS OF BREATH  


 


Aspirin  81 mg  08/29/20 15:15  09/01/20 09:11





  Ecotrin -  PO   81 mg





  DAILY MARISSA   Administration


 


Atorvastatin Calcium  40 mg  08/29/20 22:00  08/31/20 22:01





  Lipitor -  PO   40 mg





  HS MARISSA   Administration


 


Enoxaparin Sodium  40 mg  08/29/20 10:00  09/01/20 09:11





  Lovenox -  SQ   40 mg





  DAILY MARISSA   Administration


 


Sodium Chloride  1,000 mls @ 75 mls/hr  08/29/20 00:30  08/31/20 09:33





  Normal Saline -  IV   75 mls/hr





  ASDIR MARISSA   Administration


 


Ceftriaxone Sodium 1 gm/  50 mls @ 100 mls/hr  08/29/20 22:00  08/31/20 22:01





  Dextrose  IVPB   100 mls/hr





  Q24H MARISSA   Administration





  Protocol  


 


Metoprolol Succinate  25 mg  08/29/20 15:15  09/01/20 09:11





  Toprol Xl -  PO   25 mg





  DAILY MARISSA   Administration


 


Multivitamins/Minerals/Vitamin C  1 tab  08/30/20 10:00  09/01/20 09:11





  Tab-A-Vit -  PO   1 tab





  DAILY MARISSA   Administration


 


Polyethylene Glycol  17 gm  08/30/20 10:00  09/01/20 09:12





  Miralax (For Daily Use) -  PO   17 gm





  DAILY MARISSA   Administration











ASSESSMENT/PLAN:








Problem List





- Problems


(1) Acute metabolic encephalopathy


Assessment/Plan: 


head ct negative, blood cultures negative to date


UC + Kleb, on Ceftriaxone


ID consulted for increased WBC on todays labs


will also order swallow eval for possible aspiration pneumonia as cause of 

rising WBC


monitor mental status


Code(s): G93.41 - METABOLIC ENCEPHALOPATHY   





(2) CAD (coronary artery disease)


Assessment/Plan: 


on lipitor


Code(s): I25.10 - ATHSCL HEART DISEASE OF NATIVE CORONARY ARTERY W/O ANG PCTRS  




Qualifiers: 


   Coronary Disease-Associated Artery/Lesion type: native artery   Native vs. 

transplanted heart: native heart   Associated angina: without angina   Qualified

Code(s): I25.10 - Atherosclerotic heart disease of native coronary artery 

without angina pectoris   





(3) COPD (chronic obstructive pulmonary disease)


Assessment/Plan: 


mild wheezing of anterior upper lobes, diminished otherwise.  tolerating room a

ir


Code(s): J44.9 - CHRONIC OBSTRUCTIVE PULMONARY DISEASE, UNSPECIFIED   


Qualifiers: 


   COPD type: unspecified COPD   Qualified Code(s): J44.9 - Chronic obstructive 

pulmonary disease, unspecified   





(4) Constipation


Assessment/Plan: 


bowel regimen ordered


Code(s): K59.00 - CONSTIPATION, UNSPECIFIED   





(5) Demand ischemia


Assessment/Plan: 


cardiology following


Code(s): I24.8 - OTHER FORMS OF ACUTE ISCHEMIC HEART DISEASE   





(6) Diastolic dysfunction


Assessment/Plan: 


no signs of volume overload.  cardiology following


lisinopril added


Code(s): I51.89 - OTHER ILL-DEFINED HEART DISEASES   





(7) Moderate protein-calorie malnutrition


Assessment/Plan: 


on supplemnts, RD following


Code(s): E44.0 - MODERATE PROTEIN-CALORIE MALNUTRITION   





(8) Prophylactic measure


Code(s): Z29.9 - ENCOUNTER FOR PROPHYLACTIC MEASURES, UNSPECIFIED   





(9) Rhabdomyolysis


Assessment/Plan: 


continue to trend daily


Code(s): M62.82 - RHABDOMYOLYSIS   





(10) DVT prophylaxis


Assessment/Plan: 


on lovenox


Code(s): Z29.9 - ENCOUNTER FOR PROPHYLACTIC MEASURES, UNSPECIFIED   





(11) Fall


Assessment/Plan: 


physical therapy evaluation


Code(s): W19.XXXA - UNSPECIFIED FALL, INITIAL ENCOUNTER   





Visit type





- Emergency Visit


Emergency Visit: Yes


ED Registration Date: 08/28/20


Care time: The patient presented to the Emergency Department on the above date 

and was hospitalized for further evaluation of their emergent condition.





- New Patient


This patient is new to me today: Yes


Date on this admission: 09/01/20





- Critical Care


Critical Care patient: No





- Discharge Referral


Referred to [facility] No





- Medication Review


Med list reviewed for High Risk Meds patients 65 and older: Yes

## 2020-09-02 LAB
ALBUMIN SERPL-MCNC: 2.1 G/DL (ref 3.4–5)
ALP SERPL-CCNC: 112 U/L (ref 45–117)
ALT SERPL-CCNC: 92 U/L (ref 13–61)
ANION GAP SERPL CALC-SCNC: 8 MMOL/L (ref 8–16)
AST SERPL-CCNC: 109 U/L (ref 15–37)
BASOPHILS # BLD: 0.4 % (ref 0–2)
BILIRUB SERPL-MCNC: 0.7 MG/DL (ref 0.2–1)
BUN SERPL-MCNC: 15 MG/DL (ref 7–18)
CALCIUM SERPL-MCNC: 6.9 MG/DL (ref 8.5–10.1)
CHLORIDE SERPL-SCNC: 116 MMOL/L (ref 98–107)
CO2 SERPL-SCNC: 21 MMOL/L (ref 21–32)
CREAT SERPL-MCNC: 0.7 MG/DL (ref 0.55–1.3)
DEPRECATED RDW RBC AUTO: 14.4 % (ref 11.9–15.9)
EOSINOPHIL # BLD: 0.2 % (ref 0–4.5)
GLUCOSE SERPL-MCNC: 96 MG/DL (ref 74–106)
HCT VFR BLD CALC: 39.1 % (ref 35.4–49)
HGB BLD-MCNC: 12.8 GM/DL (ref 11.7–16.9)
LYMPHOCYTES # BLD: 9.3 % (ref 8–40)
MAGNESIUM SERPL-MCNC: 1.8 MG/DL (ref 1.8–2.4)
MCH RBC QN AUTO: 32.3 PG (ref 25.7–33.7)
MCHC RBC AUTO-ENTMCNC: 32.9 G/DL (ref 32–35.9)
MCV RBC: 98.2 FL (ref 80–96)
MONOCYTES # BLD AUTO: 12.7 % (ref 3.8–10.2)
NEUTROPHILS # BLD: 77.4 % (ref 42.8–82.8)
PLATELET # BLD AUTO: 136 K/MM3 (ref 134–434)
PMV BLD: 9.7 FL (ref 7.5–11.1)
POTASSIUM SERPLBLD-SCNC: 3.2 MMOL/L (ref 3.5–5.1)
PROT SERPL-MCNC: 5 G/DL (ref 6.4–8.2)
RBC # BLD AUTO: 3.98 M/MM3 (ref 4–5.6)
SODIUM SERPL-SCNC: 145 MMOL/L (ref 136–145)
WBC # BLD AUTO: 11.9 K/MM3 (ref 4–10)

## 2020-09-02 RX ADMIN — CALCIUM SCH MG: 500 TABLET ORAL at 22:37

## 2020-09-02 RX ADMIN — ASPIRIN SCH MG: 81 TABLET, COATED ORAL at 13:14

## 2020-09-02 RX ADMIN — MULTIVITAMIN TABLET SCH TAB: TABLET at 11:55

## 2020-09-02 RX ADMIN — SODIUM CHLORIDE SCH: 9 INJECTION, SOLUTION INTRAVENOUS at 07:53

## 2020-09-02 RX ADMIN — POLYETHYLENE GLYCOL 3350 SCH: 17 POWDER, FOR SOLUTION ORAL at 11:49

## 2020-09-02 RX ADMIN — DOCUSATE SODIUM SCH MG: 100 CAPSULE, LIQUID FILLED ORAL at 00:08

## 2020-09-02 RX ADMIN — ATORVASTATIN CALCIUM SCH MG: 40 TABLET, FILM COATED ORAL at 00:08

## 2020-09-02 RX ADMIN — CEFTRIAXONE SCH MLS/HR: 1 INJECTION, POWDER, FOR SOLUTION INTRAMUSCULAR; INTRAVENOUS at 00:08

## 2020-09-02 RX ADMIN — DOCUSATE SODIUM SCH: 100 CAPSULE, LIQUID FILLED ORAL at 14:46

## 2020-09-02 RX ADMIN — CALCIUM SCH MG: 500 TABLET ORAL at 13:14

## 2020-09-02 RX ADMIN — LISINOPRIL SCH MG: 5 TABLET ORAL at 11:55

## 2020-09-02 RX ADMIN — ATORVASTATIN CALCIUM SCH MG: 40 TABLET, FILM COATED ORAL at 22:37

## 2020-09-02 RX ADMIN — DOCUSATE SODIUM SCH: 100 CAPSULE, LIQUID FILLED ORAL at 07:54

## 2020-09-02 RX ADMIN — ENOXAPARIN SODIUM SCH MG: 40 INJECTION SUBCUTANEOUS at 11:54

## 2020-09-02 RX ADMIN — DOCUSATE SODIUM SCH MG: 100 CAPSULE, LIQUID FILLED ORAL at 22:37

## 2020-09-02 RX ADMIN — CEFTRIAXONE SCH MLS/HR: 1 INJECTION, POWDER, FOR SOLUTION INTRAMUSCULAR; INTRAVENOUS at 22:37

## 2020-09-02 NOTE — PN
Progress Note (short form)





- Note


Progress Note: 





PULMONARY








CONSULTATION DICTATED 9/2/20





IMP  S/P FALL ?SYNCOPE


      R/O RLL INFILTRATE LIKELY ASPIRATION


      ALTERED MENTAL STATUS


      COPD  


      UTI


      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


      HTN


      ASHD


      + TROPONIN LIKELY DEMAND ISCHEMIA


      ELEVATED LFTS





PLAN  SUPPLEMENTAL O2


         INHALED BRONCHODILATORS


         ABX AS PER ID


        TREND LFTS,TROPONINS


        MONITOR LYTES


        CHEST CT


        ASPIRATION PRECAUTIONS





DR MONTEJO





Problem List





- Problems


(1) Acute metabolic encephalopathy


Code(s): G93.41 - METABOLIC ENCEPHALOPATHY   





(2) CAD (coronary artery disease)


Code(s): I25.10 - ATHSCL HEART DISEASE OF NATIVE CORONARY ARTERY W/O ANG PCTRS  




Qualifiers: 


   Coronary Disease-Associated Artery/Lesion type: native artery   Native vs. 

transplanted heart: native heart   Associated angina: without angina   Qualified

Code(s): I25.10 - Atherosclerotic heart disease of native coronary artery 

without angina pectoris   





(3) COPD (chronic obstructive pulmonary disease)


Code(s): J44.9 - CHRONIC OBSTRUCTIVE PULMONARY DISEASE, UNSPECIFIED   


Qualifiers: 


   COPD type: unspecified COPD   Qualified Code(s): J44.9 - Chronic obstructive 

pulmonary disease, unspecified   





(4) Demand ischemia


Code(s): I24.8 - OTHER FORMS OF ACUTE ISCHEMIC HEART DISEASE   





(5) Diastolic dysfunction


Code(s): I51.89 - OTHER ILL-DEFINED HEART DISEASES   





(6) Fall


Code(s): W19.XXXA - UNSPECIFIED FALL, INITIAL ENCOUNTER   





(7) HTN (hypertension)


Code(s): I10 - ESSENTIAL (PRIMARY) HYPERTENSION   


Qualifiers: 


   Hypertension type: essential hypertension   Qualified Code(s): I10 - 

Essential (primary) hypertension   





(8) Hyperlipidemia


Code(s): E78.5 - HYPERLIPIDEMIA, UNSPECIFIED   


Qualifiers: 


   Hyperlipidemia type: pure hypercholesterolemia   Qualified Code(s): E78.00 - 

Pure hypercholesterolemia, unspecified; E78.0 - Pure hypercholesterolemia   





(9) UTI (urinary tract infection)


Code(s): N39.0 - URINARY TRACT INFECTION, SITE NOT SPECIFIED   


Qualifiers: 


   Urinary tract infection type: site unspecified   Hematuria presence: with 

hematuria   Qualified Code(s): N39.0 - Urinary tract infection, site not 

specified; R31.9 - Hematuria, unspecified   





(10) Weakness


Code(s): R53.1 - WEAKNESS

## 2020-09-02 NOTE — PN
Progress Note, SLP





- Note


Progress Note: 


CXR noted





Pulmonary-IMP  S/P FALL ?SYNCOPE


      R/O RLL INFILTRATE LIKELY ASPIRATION


   





Downgraded to puree/nectar thick liquid


                                Selected Entries











  09/01/20 09/01/20 09/01/20





  11:57 14:00 22:00


 


Breakfast 0  


 


Diet Tolerated Refused Poor 


 


Lunch  25% 


 


Supper   25%


 


Temperature   


 


Pulse Rate   


 


Blood Pressure   














  09/02/20 09/02/20





  01:24 05:00


 


Breakfast  


 


Diet Tolerated  


 


Lunch  


 


Supper  


 


Temperature 98.8 F 


 


Pulse Rate 90 98 H


 


Blood Pressure 129/77 135/76








                                Laboratory Tests











  08/31/20 09/01/20 09/02/20





  06:10 05:40 09:51


 


WBC  9.5  14.8 H  11.9 H








Suggest MBS tro further assess swallowing function, r/o aspiration

## 2020-09-02 NOTE — PN
Progress Note, Physician


History of Present Illness: 


No events on telemetry, denies recurrent near or true syncope.








- Current Medication List


Current Medications: 


Active Medications





Albuterol Sulfate (Ventolin Hfa Inhaler -)  2 puff IH Q6H PRN


   PRN Reason: SHORTNESS OF BREATH


Albuterol/Ipratropium (Duoneb -)  1 amp NEB Q6H PRN


   PRN Reason: SHORTNESS OF BREATH


Aspirin (Ecotrin -)  81 mg PO DAILY [facility]


   Last Admin: 09/01/20 09:11 Dose:  81 mg


   Documented by: 


Atorvastatin Calcium (Lipitor -)  40 mg PO HS [facility]


   Last Admin: 09/02/20 00:08 Dose:  40 mg


   Documented by: 


Docusate Sodium (Colace -)  100 mg PO TID [facility]


   Last Admin: 09/02/20 07:54 Dose:  Not Given


   Documented by: 


Enoxaparin Sodium (Lovenox -)  40 mg SQ DAILY [facility]


   Last Admin: 09/01/20 09:11 Dose:  40 mg


   Documented by: 


Sodium Chloride (Normal Saline -)  1,000 mls @ 75 mls/hr IV ASDIR [facility]


   Last Admin: 09/02/20 07:53 Dose:  Not Given


   Documented by: 


Ceftriaxone Sodium 1 gm/ (Dextrose)  50 mls @ 100 mls/hr IVPB Q24H [facility]; Protocol


   Last Admin: 09/02/20 00:08 Dose:  100 mls/hr


   Documented by: 


Lisinopril (Prinivil)  5 mg PO DAILY [facility]


Metoprolol Succinate (Toprol Xl -)  25 mg PO DAILY [facility]


   Last Admin: 09/01/20 09:11 Dose:  25 mg


   Documented by: 


Multivitamins/Minerals/Vitamin C (Tab-A-Vit -)  1 tab PO DAILY [facility]


   Last Admin: 09/01/20 09:11 Dose:  1 tab


   Documented by: 


Polyethylene Glycol (Miralax (For Daily Use) -)  17 gm PO DAILY [facility]


   Last Admin: 09/01/20 09:12 Dose:  17 gm


   Documented by: 











- Objective


Vital Signs: 


                                   Vital Signs











Temperature  98.8 F   09/02/20 01:24


 


Pulse Rate  98 H  09/02/20 05:00


 


Respiratory Rate  18   09/02/20 05:00


 


Blood Pressure  135/76   09/02/20 05:00


 


O2 Sat by Pulse Oximetry (%)  94 L  09/01/20 21:00











Constitutional: Yes: No Distress, Calm


Neck: Yes: Supple


Cardiovascular: Yes: Regular Rate and Rhythm


Respiratory: Yes: Regular, Diminished


Gastrointestinal: Yes: Normal Bowel Sounds, Soft


Edema: No


Labs: 


                                    INR, PTT











INR  1.28  (0.83-1.09)  H  08/29/20  05:40    














- ....Imaging


Chest X-ray: Report Reviewed (NAD)


EKG: Report Reviewed (Tele: NSR occ PAC)





Problem List





- Problems


(1) CAD (coronary artery disease)


Code(s): I25.10 - ATHSCL HEART DISEASE OF NATIVE CORONARY ARTERY W/O ANG PCTRS  




Qualifiers: 


   Coronary Disease-Associated Artery/Lesion type: native artery   Native vs. 

transplanted heart: native heart   Associated angina: without angina   Qualified

Code(s): I25.10 - Atherosclerotic heart disease of native coronary artery 

without angina pectoris   





(2) COPD (chronic obstructive pulmonary disease)


Code(s): J44.9 - CHRONIC OBSTRUCTIVE PULMONARY DISEASE, UNSPECIFIED   


Qualifiers: 


   COPD type: unspecified COPD   Qualified Code(s): J44.9 - Chronic obstructive 

pulmonary disease, unspecified   





(3) HTN (hypertension)


Code(s): I10 - ESSENTIAL (PRIMARY) HYPERTENSION   


Qualifiers: 


   Hypertension type: essential hypertension   Qualified Code(s): I10 - 

Essential (primary) hypertension   





(4) Diastolic dysfunction


Code(s): I51.89 - OTHER ILL-DEFINED HEART DISEASES   





(5) Demand ischemia


Code(s): I24.8 - OTHER FORMS OF ACUTE ISCHEMIC HEART DISEASE   





(6) Hyperlipidemia


Code(s): E78.5 - HYPERLIPIDEMIA, UNSPECIFIED   


Qualifiers: 


   Hyperlipidemia type: pure hypercholesterolemia   Qualified Code(s): E78.00 - 

Pure hypercholesterolemia, unspecified; E78.0 - Pure hypercholesterolemia   





Assessment/Plan


08/31/2020 Echo: Normal biventricular size and fxn, mild MR, TR, abnl LV 

compliance





1.  Unwitnessed fall unclear mechanical versus a syncopal episode


2.  Coronary artery disease post percutaneous coronary intervention with 

evidence of demand ischemic injury angina pectoris (Patient has not had coronary

artery bypass graft surgery as stated in the notes)


3.  Diastolic left ventricular dysfunction with class 0 New [employer] Heart 

Association classification left ventricular failure


4.  Hypertensive cardiovascular disease


5.  Hypercholesterolemia


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


7.  History of lung surgery partial resection


8.  History of chronic obstructive pulmonary disease


9.  Urinary tract infection


10.  Chronic kidney disease


11.  Abnormal liver function testing


12. Fever on empiric abx





PLAN:





1.  Continue Toprol-XL 25 qd and dose titration as needed/as tolerated, 

troponins have plateaued


2.  Recommend the addition of ACE inhibitor or angiotensin receptor blocker 

therapy unless it is absolutely contraindicated provided renal function is at 

baseline


3.  Continue Lipitor 40 qd


4.  Continue daily Ecotrin 81 qd, replete K


5.  Antibiotics as per the primary team


6.  PT->gait training, dysphagia diet

## 2020-09-02 NOTE — PN
Physical Exam: 


SUBJECTIVE: Patient seen and examined.  spoke to son Kin, states ambulation 

has worsened over time


patient's brother has parkinsons disease.  son reports that patient was lucid 

two weeks ago.   baseline is aox 3. 





OBJECTIVE:


Patient is an 79 year old male with a significant past medical history of COPD, 

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

a fall.  on admission patient also noted to have toxic metabolic encephalopathy 

with moderate protein calorie malnutrition. 


------


swallow eval, now on nectar thick flds


rhabdo, re check cpk


pulmonary consult for possible early infiltrate


klebsiella UTI


moderate protein calorie malnutrition


elevated liver enzymes


------


covid status: negative as of 8/29/2020











 Period  Temp  Pulse  Resp  BP Sys/Perez  Pulse Ox


 


 Last 24 Hr  98.1 F-98.8 F    18-20  129-156/68-87  91-94








 GENERAL: The patient is awake, forgetful, confused but can tell me his name and

his sons name, knows he is in the hospital


HEAD: Normal with no signs of trauma.


EYES: PERRL, extraocular movements intact, sclera anicteric, conjunctiva clear. 

No ptosis. 


ENT: Ears normal, nares patent, oropharynx clear without exudates 


NECK: Trachea midline, full range of motion, supple. 


LUNGS: mild wheezing on anterior lobes, diminished posteriorly


HEART: Regular rate and rhythm 


ABDOMEN: Soft, nontender, nondistended, normoactive bowel sounds 


EXTREMITIES:   no edema. 


NEUROLOGICAL:  Normal speech, gait not observed.


PSYCH: Normal mood, normal affect.





Active Medications











Generic Name Dose Route Start Last Admin





  Trade Name Curtis  PRN Reason Stop Dose Admin


 


Albuterol Sulfate  2 puff  08/29/20 16:11 





  Ventolin Hfa Inhaler -  IH  





  Q6H PRN  





  SHORTNESS OF BREATH  


 


Albuterol/Ipratropium  1 amp  08/30/20 13:05 





  Duoneb -  NEB  





  Q6H PRN  





  SHORTNESS OF BREATH  


 


Aspirin  81 mg  08/29/20 15:15  09/01/20 09:11





  Ecotrin -  PO   81 mg





  DAILY MARISSA   Administration


 


Atorvastatin Calcium  40 mg  08/29/20 22:00  09/02/20 00:08





  Lipitor -  PO   40 mg





  HS MARISSA   Administration


 


Docusate Sodium  100 mg  09/01/20 22:00  09/02/20 07:54





  Colace -  PO   Not Given





  TID MARISSA  


 


Enoxaparin Sodium  40 mg  08/29/20 10:00  09/01/20 09:11





  Lovenox -  SQ   40 mg





  DAILY MARISSA   Administration


 


Sodium Chloride  1,000 mls @ 75 mls/hr  08/29/20 00:30  09/02/20 07:53





  Normal Saline -  IV   Not Given





  ASDIR MARISSA  


 


Ceftriaxone Sodium 1 gm/  50 mls @ 100 mls/hr  08/29/20 22:00  09/02/20 00:08





  Dextrose  IVPB   100 mls/hr





  Q24H MARISSA   Administration





  Protocol  


 


Lisinopril  5 mg  09/02/20 10:00 





  Prinivil  PO  





  DAILY MARISSA  


 


Metoprolol Succinate  25 mg  08/29/20 15:15  09/01/20 09:11





  Toprol Xl -  PO   25 mg





  DAILY MARISSA   Administration


 


Multivitamins/Minerals/Vitamin C  1 tab  08/30/20 10:00  09/01/20 09:11





  Tab-A-Vit -  PO   1 tab





  DAILY MARSISA   Administration


 


Polyethylene Glycol  17 gm  08/30/20 10:00  09/01/20 09:12





  Miralax (For Daily Use) -  PO   17 gm





  DAILY MARISSA   Administration











ASSESSMENT/PLAN:








Problem List





- Problems


(1) UTI due to Klebsiella species


Assessment/Plan: 


on ceftriaxone


Code(s): N39.0 - URINARY TRACT INFECTION, SITE NOT SPECIFIED; B96.89 - OTH 

BACTERIAL AGENTS AS THE CAUSE OF DISEASES CLASSD [id number]   





(2) Acute metabolic encephalopathy


Assessment/Plan: 


head ct negative, blood cultures negative to date


UC + Kleb, on Ceftriaxone 1 gram daily


ID consulted for increased WBC, recommend to continue to monitor and if WBC 

increases, may consider switching antbiotic therapy


swallow eval showed need for aspiration precautions with nectar thick fluids


monitor mental status


Code(s): G93.41 - METABOLIC ENCEPHALOPATHY   





(3) CAD (coronary artery disease)


Assessment/Plan: 


on lipitor


Code(s): I25.10 - ATHSCL HEART DISEASE OF NATIVE CORONARY ARTERY W/O ANG PCTRS  




Qualifiers: 


   Coronary Disease-Associated Artery/Lesion type: native artery   Native vs. 

transplanted heart: native heart   Associated angina: without angina   Qualified

Code(s): I25.10 - Atherosclerotic heart disease of native coronary artery 

without angina pectoris   





(4) COPD (chronic obstructive pulmonary disease)


Assessment/Plan: 


mild wheezing of anterior upper lobes, diminished otherwise.  on 2 liters of 

nasal cannula


pulmonary consulted


Code(s): J44.9 - CHRONIC OBSTRUCTIVE PULMONARY DISEASE, UNSPECIFIED   


Qualifiers: 


   COPD type: unspecified COPD   Qualified Code(s): J44.9 - Chronic obstructive 

pulmonary disease, unspecified   





(5) Constipation


Assessment/Plan: 


bowel regimen ordered


Code(s): K59.00 - CONSTIPATION, UNSPECIFIED   





(6) Demand ischemia


Assessment/Plan: 


cardiology following


Code(s): I24.8 - OTHER FORMS OF ACUTE ISCHEMIC HEART DISEASE   





(7) Diastolic dysfunction


Assessment/Plan: 


no signs of volume overload.  cardiology following


lisinopril added


Code(s): I51.89 - OTHER ILL-DEFINED HEART DISEASES   





(8) Moderate protein-calorie malnutrition


Assessment/Plan: 


on supplemnts, RD following


Code(s): E44.0 - MODERATE PROTEIN-CALORIE MALNUTRITION   





(9) Prophylactic measure


Code(s): Z29.9 - ENCOUNTER FOR PROPHYLACTIC MEASURES, UNSPECIFIED   





(10) Rhabdomyolysis


Assessment/Plan: 


CPK improving, daily labs


continue ivf


Code(s): M62.82 - RHABDOMYOLYSIS   





(11) DVT prophylaxis


Assessment/Plan: 


on lovenox


Code(s): Z29.9 - ENCOUNTER FOR PROPHYLACTIC MEASURES, UNSPECIFIED   





(12) Fall


Assessment/Plan: 


physical therapy evaluation. patient for SNF placement


Code(s): W19.XXXA - UNSPECIFIED FALL, INITIAL ENCOUNTER   





Visit type





- Emergency Visit


Emergency Visit: Yes


ED Registration Date: 08/28/20


Care time: The patient presented to the Emergency Department on the above date 

and was hospitalized for further evaluation of their emergent condition.





- New Patient


This patient is new to me today: No





- Critical Care


Critical Care patient: No





- Discharge Referral


Referred to [facility] No





- Medication Review


Med list reviewed for High Risk Meds patients 65 and older: Yes

## 2020-09-02 NOTE — EKG
Test Reason : 

Blood Pressure : ***/*** mmHG

Vent. Rate : 080 BPM     Atrial Rate : 080 BPM

   P-R Int : 162 ms          QRS Dur : 082 ms

    QT Int : 376 ms       P-R-T Axes : 085 056 068 degrees

   QTc Int : 433 ms

 

NORMAL SINUS RHYTHM

LEFT VENTRICULAR HYPERTROPHY WITH REPOLARIZATION ABNORMALITY

CANNOT RULE OUT SEPTAL INFARCT (CITED ON OR BEFORE 28-AUG-2020)

ABNORMAL ECG

Confirmed by MD MYRA, ISHAAN ([id number]) on 9/2/2020 1:14:20 PM

 

Referred By:             Confirmed By:ISHAAN RENTERIA MD

## 2020-09-02 NOTE — PN
Progress Note, Physician


History of Present Illness: 





stable


still confused


wants to get up





- Current Medication List


Current Medications: 


Active Medications





Albuterol Sulfate (Ventolin Hfa Inhaler -)  2 puff IH Q6H PRN


   PRN Reason: SHORTNESS OF BREATH


Albuterol/Ipratropium (Duoneb -)  1 amp NEB Q6H PRN


   PRN Reason: SHORTNESS OF BREATH


Aspirin (Ecotrin -)  81 mg PO DAILY [facility]


   Last Admin: 09/01/20 09:11 Dose:  81 mg


   Documented by: 


Atorvastatin Calcium (Lipitor -)  40 mg PO HS [facility]


   Last Admin: 09/02/20 00:08 Dose:  40 mg


   Documented by: 


Calcium Carbonate (Os-Noah 500mg -)  500 mg PO BID MARISSA


Docusate Sodium (Colace -)  100 mg PO TID [facility]


   Last Admin: 09/02/20 07:54 Dose:  Not Given


   Documented by: 


Enoxaparin Sodium (Lovenox -)  40 mg SQ DAILY [facility]


   Last Admin: 09/02/20 11:54 Dose:  40 mg


   Documented by: 


Sodium Chloride (Normal Saline -)  1,000 mls @ 75 mls/hr IV ASDIR [facility]


   Last Admin: 09/02/20 07:53 Dose:  Not Given


   Documented by: 


Ceftriaxone Sodium 1 gm/ (Dextrose)  50 mls @ 100 mls/hr IVPB Q24H [facility]; Protocol


   Last Admin: 09/02/20 00:08 Dose:  100 mls/hr


   Documented by: 


Lisinopril (Prinivil)  5 mg PO DAILY [facility]


   Last Admin: 09/02/20 11:55 Dose:  5 mg


   Documented by: 


Metoprolol Succinate (Toprol Xl -)  25 mg PO DAILY [facility]


   Last Admin: 09/02/20 11:55 Dose:  25 mg


   Documented by: 


Multivitamins/Minerals/Vitamin C (Tab-A-Vit -)  1 tab PO DAILY [facility]


   Last Admin: 09/02/20 11:55 Dose:  1 tab


   Documented by: 


Polyethylene Glycol (Miralax (For Daily Use) -)  17 gm PO DAILY [facility]


   Last Admin: 09/02/20 11:49 Dose:  Not Given


   Documented by: 











- Objective


Vital Signs: 


                                   Vital Signs











Temperature  98.8 F   09/02/20 01:24


 


Pulse Rate  98 H  09/02/20 05:00


 


Respiratory Rate  18   09/02/20 05:00


 


Blood Pressure  135/76   09/02/20 05:00


 


O2 Sat by Pulse Oximetry (%)  94 L  09/01/20 21:00











Constitutional: Yes: No Distress, Calm, Thin (failure to thrive)


Cardiovascular: Yes: S1, S2


Respiratory: Yes: Regular, CTA Bilaterally


Gastrointestinal: Yes: Normal Bowel Sounds, Soft


Musculoskeletal: Yes: WNL


Extremities: Yes: Erythema


Neurological: Yes: Alert, Other


Psychiatric: Yes: Other


Labs: 


                                    CBC, BMP





                                 09/02/20 09:51 





                                 09/02/20 09:51 





                                    INR, PTT











INR  1.28  (0.83-1.09)  H  08/29/20  05:40    














Assessment/Plan





 Problem List 





- Problems


(1) Acute metabolic encephalopathy


Code(s): G93.41 - METABOLIC ENCEPHALOPATHY   





(2) CAD (coronary artery disease)


Code(s): I25.10 - ATHSCL HEART DISEASE OF NATIVE CORONARY ARTERY W/O ANG PCTRS  




Qualifiers: 


   Coronary Disease-Associated Artery/Lesion type: native artery   Native vs. 

transplanted heart: native heart   Associated angina: without angina   Qualified

Code(s): I25.10 - Atherosclerotic heart disease of native coronary artery 

without angina pectoris   





(3) COPD (chronic obstructive pulmonary disease)


Code(s): J44.9 - CHRONIC OBSTRUCTIVE PULMONARY DISEASE, UNSPECIFIED   


Qualifiers: 


   COPD type: unspecified COPD   Qualified Code(s): J44.9 - Chronic obstructive 

pulmonary disease, unspecified   





(4) Demand ischemia


Code(s): I24.8 - OTHER FORMS OF ACUTE ISCHEMIC HEART DISEASE   





(5) Diastolic dysfunction


Code(s): I51.89 - OTHER ILL-DEFINED HEART DISEASES   





(6) Fall


Code(s): W19.XXXA - UNSPECIFIED FALL, INITIAL ENCOUNTER   





(7) HTN (hypertension)


Code(s): I10 - ESSENTIAL (PRIMARY) HYPERTENSION   


Qualifiers: 


   Hypertension type: essential hypertension   Qualified Code(s): I10 - 

Essential (primary) hypertension   





(8) Hyperlipidemia


Code(s): E78.5 - HYPERLIPIDEMIA, UNSPECIFIED   


Qualifiers: 


   Hyperlipidemia type: pure hypercholesterolemia   Qualified Code(s): E78.00 - 

Pure hypercholesterolemia, unspecified; E78.0 - Pure hypercholesterolemia   





(9) UTI (urinary tract infection)


Code(s): N39.0 - URINARY TRACT INFECTION, SITE NOT SPECIFIED   


Qualifiers: 


   Urinary tract infection type: site unspecified   Hematuria presence: with 

hematuria   Qualified Code(s): N39.0 - Urinary tract infection, site not 

specified; R31.9 - Hematuria, unspecified   





(10) Weakness


Code(s): R53.1 - WEAKNESS   








plan


continue abx


monitor


nutrition


rest as per the team

## 2020-09-03 LAB
ALBUMIN SERPL-MCNC: 2.3 G/DL (ref 3.4–5)
ALP SERPL-CCNC: 125 U/L (ref 45–117)
ALT SERPL-CCNC: 89 U/L (ref 13–61)
ANION GAP SERPL CALC-SCNC: 9 MMOL/L (ref 8–16)
AST SERPL-CCNC: 106 U/L (ref 15–37)
BASOPHILS # BLD: 0.5 % (ref 0–2)
BILIRUB SERPL-MCNC: 0.7 MG/DL (ref 0.2–1)
BUN SERPL-MCNC: 16 MG/DL (ref 7–18)
CALCIUM SERPL-MCNC: 7.9 MG/DL (ref 8.5–10.1)
CHLORIDE SERPL-SCNC: 113 MMOL/L (ref 98–107)
CO2 SERPL-SCNC: 22 MMOL/L (ref 21–32)
CREAT SERPL-MCNC: 0.7 MG/DL (ref 0.55–1.3)
DEPRECATED RDW RBC AUTO: 14.1 % (ref 11.9–15.9)
EOSINOPHIL # BLD: 1.3 % (ref 0–4.5)
GLUCOSE SERPL-MCNC: 96 MG/DL (ref 74–106)
HCT VFR BLD CALC: 38.7 % (ref 35.4–49)
HGB BLD-MCNC: 12.9 GM/DL (ref 11.7–16.9)
LYMPHOCYTES # BLD: 14.9 % (ref 8–40)
MAGNESIUM SERPL-MCNC: 2.1 MG/DL (ref 1.8–2.4)
MCH RBC QN AUTO: 32 PG (ref 25.7–33.7)
MCHC RBC AUTO-ENTMCNC: 33.2 G/DL (ref 32–35.9)
MCV RBC: 96.3 FL (ref 80–96)
MONOCYTES # BLD AUTO: 15 % (ref 3.8–10.2)
NEUTROPHILS # BLD: 68.3 % (ref 42.8–82.8)
PLATELET # BLD AUTO: 158 K/MM3 (ref 134–434)
PMV BLD: 8.8 FL (ref 7.5–11.1)
POTASSIUM SERPLBLD-SCNC: 3.5 MMOL/L (ref 3.5–5.1)
PROT SERPL-MCNC: 5.5 G/DL (ref 6.4–8.2)
RBC # BLD AUTO: 4.02 M/MM3 (ref 4–5.6)
SODIUM SERPL-SCNC: 144 MMOL/L (ref 136–145)
WBC # BLD AUTO: 9.8 K/MM3 (ref 4–10)

## 2020-09-03 RX ADMIN — SODIUM CHLORIDE SCH MLS/HR: 9 INJECTION, SOLUTION INTRAVENOUS at 06:56

## 2020-09-03 RX ADMIN — MULTIVITAMIN TABLET SCH TAB: TABLET at 10:40

## 2020-09-03 RX ADMIN — ASPIRIN SCH MG: 81 TABLET, COATED ORAL at 10:40

## 2020-09-03 RX ADMIN — ENOXAPARIN SODIUM SCH MG: 40 INJECTION SUBCUTANEOUS at 10:41

## 2020-09-03 RX ADMIN — DOCUSATE SODIUM SCH: 100 CAPSULE, LIQUID FILLED ORAL at 21:59

## 2020-09-03 RX ADMIN — DOCUSATE SODIUM SCH MG: 100 CAPSULE, LIQUID FILLED ORAL at 06:54

## 2020-09-03 RX ADMIN — CALCIUM SCH MG: 500 TABLET ORAL at 21:47

## 2020-09-03 RX ADMIN — BUDESONIDE AND FORMOTEROL FUMARATE DIHYDRATE SCH PUFF: 80; 4.5 AEROSOL RESPIRATORY (INHALATION) at 21:47

## 2020-09-03 RX ADMIN — LISINOPRIL SCH MG: 5 TABLET ORAL at 10:40

## 2020-09-03 RX ADMIN — CEFTRIAXONE SCH MLS/HR: 1 INJECTION, POWDER, FOR SOLUTION INTRAMUSCULAR; INTRAVENOUS at 21:47

## 2020-09-03 RX ADMIN — DOCUSATE SODIUM SCH: 100 CAPSULE, LIQUID FILLED ORAL at 15:53

## 2020-09-03 RX ADMIN — CALCIUM SCH MG: 500 TABLET ORAL at 10:40

## 2020-09-03 RX ADMIN — DOCUSATE SODIUM SCH MG: 100 CAPSULE, LIQUID FILLED ORAL at 21:46

## 2020-09-03 RX ADMIN — POLYETHYLENE GLYCOL 3350 SCH: 17 POWDER, FOR SOLUTION ORAL at 10:40

## 2020-09-03 NOTE — PN
Progress Note, SLP





- Note


Progress Note: 





Suspected silent aspiration confirmed on MBS.





                                Selected Entries











  09/02/20 09/02/20 09/03/20





  10:00 18:00 01:29


 


Breakfast 25%  


 


Lunch 50%  


 


Supper  25% 


 


Temperature   98.4 F


 


Pulse Rate   80


 


Blood Pressure   139/76














  09/03/20





  06:00


 


Breakfast 


 


Lunch 


 


Supper 


 


Temperature 


 


Pulse Rate 67


 


Blood Pressure 150/66








                                Laboratory Tests











  09/03/20





  07:50


 


WBC  9.8








Continue puree/nectar thick liquid








Medication Administration: Crushed with applesauce





Supplement: Magic Cup, Ensure Pudding, Other (2 Reina HN)





Aspiration precautions sign and recommendation sign posted for staff education





Small Bites, Chin Tuck/Down, Clear Pocket Food, Facilitative Feeding, Safe Rate,

1/2 tsp. at a time, Elevate HOB during feed, Other (tell pt to swallow, palpate 

larynx for reflex)

## 2020-09-03 NOTE — CONS
DATE OF CONSULTATION:  09/02/2020

 

REFERRING PHYSICIAN:   Carina Lewis NP

 

Patient is an 79-year-old male with past medical history of COPD, hypertension, ASHD,

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

admitted to [facility] on August 28 secondary to fall.  Patient

states that he was sleeping and woke up to use to bathroom.  When he tried to stand

up, he fell to the ground and was not able to stand up.  He denied any complaints of

chest pain, nausea, vomiting, and diaphoresis associated with this.  He stated that

he was able to call for help and brought to the emergency room.  As per the patient's

son, the patient's mental status has been altered from his baseline.  Normally, he is

alert and oriented x3.  According to the patient's son on admission, the patient's

memory is progressively declining over the past 6 months but has increased since his

fall earlier the day prior to admission.  Apparently, he ambulates at home with a

cane.

 

Hospitalization, the patient was evaluated by cardiology consultation as well as

infectious disease consultation secondary to elevated WBC and abnormal UA.  He was

placed on antibiotic therapy.  Of note, his chest x-ray revealed early infiltrate in

the right lung.

 

PAST MEDICAL HISTORY:  Again, includes COPD, UTI, history of partial lung resection

secondary to pulmonary nodule secondary to TB, ASHD, hypertension, elevated LFTs.

 

REVIEW OF SYSTEMS:  Unable to obtain.  Patient is mildly confused.

 

SOCIAL HISTORY:  No occupational exposures.  Positive history of tobacco use, quit 20

years ago.

 

MEDICATIONS:  Include ceftriaxone, Prinivil, Lovenox, albuterol, DuoNeb, Toprol,

MiraLAX, normal saline, Lipitor, Ecotrin, and Os-Noah.

 

PHYSICAL EXAMINATION:

General:  Patient is an elderly male, awake, alert, mildly confused, in no acute

distress.

Vital Signs:  He is afebrile.  Blood pressure is 128/71.  Respiratory rate is 20.  O2

saturation is 92% on 2 L nasal cannula.

HEENT:  Normocephalic, atraumatic.

Neck:  Supple.

Heart:  Regular.  S1, S2.

Chest:  Diminished breath sounds bilaterally.

Abdomen:  Soft.  Bowel sounds are positive.

Extremities:  No cyanosis or edema.

 

LABORATORY DATA:  WBC is 11.9, hemoglobin 12.8, hematocrit 39.1, with a platelet

count of 136,000.  UA is positive protein, 3+ heme, 3+ ketones, 1+ leukocyte

esterase, and nitrites are positive.  BUN is 15, creatinine 0.7.  Calcium is 6.9. 

AST is 109, ALT is 92.  CK is 576.  Troponin 0.39.

 

Chest x-ray as noted earlier.

 

IMPRESSION:

1.  Status post fall, questionable syncope.

2.  Suspected right lower lobe infiltrate, likely secondary to aspiration.

3.  Altered mental status, likely toxic metabolic encephalopathy secondary to

infection.

4.  Chronic obstructive pulmonary disease.

5.  Urinary tract infection.

6.  History of partial left lung resection secondary to pulmonary nodules secondary

to tuberculosis.

7.  Hypertension.

8.  Atherosclerotic heart disease.

9.  Positive troponin, likely demand ischemia.

10.  Elevated liver function tests.

 

PLAN:  Supplemental O2, inhaled bronchodilators, antibiotics as per Infectious

Disease.  Trend LFTs, troponins.  Monitor electrolytes.  Obtain CT scan of the chest.

 Aspiration precautions.

 

 

SEVERIANO MONTEJO M.D.

 

[id number]

DD: 09/02/2020 19:52

DT: 09/03/2020 06:43

Job #:[id number] /   [id number]

## 2020-09-03 NOTE — PN
Progress Note, Physician


History of Present Illness: 





stable


denies any complaints





- Current Medication List


Current Medications: 


Active Medications





Albuterol Sulfate (Ventolin Hfa Inhaler -)  2 puff IH Q6H PRN


   PRN Reason: SHORTNESS OF BREATH


Albuterol/Ipratropium (Duoneb -)  1 amp NEB Q6H PRN


   PRN Reason: SHORTNESS OF BREATH


Aspirin (Ecotrin -)  81 mg PO DAILY [facility]


   Last Admin: 09/03/20 10:40 Dose:  81 mg


   Documented by: 


Atorvastatin Calcium (Lipitor -)  40 mg PO HS [facility]


   Last Admin: 09/02/20 22:37 Dose:  40 mg


   Documented by: 


Calcium Carbonate (Os-Noah 500mg -)  500 mg PO BID [facility]


   Last Admin: 09/03/20 10:40 Dose:  500 mg


   Documented by: 


Docusate Sodium (Colace -)  100 mg PO TID [facility]


   Last Admin: 09/03/20 06:54 Dose:  100 mg


   Documented by: 


Enoxaparin Sodium (Lovenox -)  40 mg SQ DAILY [facility]


   Last Admin: 09/03/20 10:41 Dose:  40 mg


   Documented by: 


Sodium Chloride (Normal Saline -)  1,000 mls @ 75 mls/hr IV ASDIR [facility]


   Last Admin: 09/03/20 06:56 Dose:  75 mls/hr


   Documented by: 


Ceftriaxone Sodium 1 gm/ (Dextrose)  50 mls @ 100 mls/hr IVPB Q24H [facility]; Protocol


   Last Admin: 09/02/20 22:37 Dose:  100 mls/hr


   Documented by: 


Lisinopril (Prinivil)  5 mg PO DAILY [facility]


   Last Admin: 09/03/20 10:40 Dose:  5 mg


   Documented by: 


Metoprolol Succinate (Toprol Xl -)  25 mg PO DAILY [facility]


   Last Admin: 09/03/20 10:40 Dose:  25 mg


   Documented by: 


Multivitamins/Minerals/Vitamin C (Tab-A-Vit -)  1 tab PO DAILY [facility]


   Last Admin: 09/03/20 10:40 Dose:  1 tab


   Documented by: 


Polyethylene Glycol (Miralax (For Daily Use) -)  17 gm PO DAILY [facility]


   Last Admin: 09/03/20 10:40 Dose:  Not Given


   Documented by: 











- Objective


Vital Signs: 


                                   Vital Signs











Temperature  98.4 F   09/03/20 01:29


 


Pulse Rate  67   09/03/20 06:00


 


Respiratory Rate  20   09/03/20 06:00


 


Blood Pressure  150/66   09/03/20 06:00


 


O2 Sat by Pulse Oximetry (%)  93 L  09/02/20 22:00











Constitutional: Yes: No Distress, Calm


Neck: Yes: Supple, Trachea Midline


Cardiovascular: Yes: S1, S2


Respiratory: Yes: Regular, CTA Bilaterally


Gastrointestinal: Yes: Normal Bowel Sounds, Soft


Musculoskeletal: Yes: WNL


Extremities: Yes: WNL


Neurological: Yes: Alert


Psychiatric: Yes: Alert


Labs: 


                                    CBC, BMP





                                 09/03/20 07:50 





                                 09/03/20 07:50 





                                    INR, PTT











INR  1.28  (0.83-1.09)  H  08/29/20  05:40    














Assessment/Plan





 Problem List 





- Problems


(1) Acute metabolic encephalopathy


Code(s): G93.41 - METABOLIC ENCEPHALOPATHY   





(2) CAD (coronary artery disease)


Code(s): I25.10 - ATHSCL HEART DISEASE OF NATIVE CORONARY ARTERY W/O ANG PCTRS  




Qualifiers: 


   Coronary Disease-Associated Artery/Lesion type: native artery   Native vs. 

transplanted heart: native heart   Associated angina: without angina   Qualified

Code(s): I25.10 - Atherosclerotic heart disease of native coronary artery 

without angina pectoris   





(3) COPD (chronic obstructive pulmonary disease)


Code(s): J44.9 - CHRONIC OBSTRUCTIVE PULMONARY DISEASE, UNSPECIFIED   


Qualifiers: 


   COPD type: unspecified COPD   Qualified Code(s): J44.9 - Chronic obstructive 

pulmonary disease, unspecified   





(4) Demand ischemia


Code(s): I24.8 - OTHER FORMS OF ACUTE ISCHEMIC HEART DISEASE   





(5) Diastolic dysfunction


Code(s): I51.89 - OTHER ILL-DEFINED HEART DISEASES   





(6) Fall


Code(s): W19.XXXA - UNSPECIFIED FALL, INITIAL ENCOUNTER   





(7) HTN (hypertension)


Code(s): I10 - ESSENTIAL (PRIMARY) HYPERTENSION   


Qualifiers: 


   Hypertension type: essential hypertension   Qualified Code(s): I10 - 

Essential (primary) hypertension   





(8) Hyperlipidemia


Code(s): E78.5 - HYPERLIPIDEMIA, UNSPECIFIED   


Qualifiers: 


   Hyperlipidemia type: pure hypercholesterolemia   Qualified Code(s): E78.00 - 

Pure hypercholesterolemia, unspecified; E78.0 - Pure hypercholesterolemia   





(9) UTI (urinary tract infection)


Code(s): N39.0 - URINARY TRACT INFECTION, SITE NOT SPECIFIED   


Qualifiers: 


   Urinary tract infection type: site unspecified   Hematuria presence: with 

hematuria   Qualified Code(s): N39.0 - Urinary tract infection, site not 

specified; R31.9 - Hematuria, unspecified   





(10) Weakness


Code(s): R53.1 - WEAKNESS   








plan


continue abx


monitor

## 2020-09-03 NOTE — PN
Physical Exam: 


SUBJECTIVE: Patient seen and examined at the bedside.  Denies any malaise.  


Tells me his name and his sons name, but tells me he is in [facility] and the 

year is 2003.





OBJECTIVE:


Patient is an 79 year old male with a significant past medical history of COPD, 

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

a fall.  on admission patient also noted to have toxic metabolic encephalopathy 

with moderate protein calorie malnutrition.   He was found to have Klebsiella 

UTI and on ceftriaxone.  


------


problem list:


impaired swallowing


rhabdo


early lung infiltrate


klebsiella UTI


moderate protein calorie malnutrition


elevated liver enzymes


------


covid status: negative as of 8/29/2020


                                   Vital Signs











 Period  Temp  Pulse  Resp  BP Sys/Perez  Pulse Ox


 


 Last 24 Hr  97.9 F-98.6 F  67-80  20-20  128-150/66-76  93-93








GENERAL: The patient is awake, forgetful, confused but can tell me his name and 

his sons name, aox 1  (not year or place)


HEAD: Normal with no signs of trauma.


EYES: PERRL, extraocular movements intact, sclera anicteric, conjunctiva clear. 

No ptosis. 


ENT: Ears normal, nares patent, oropharynx clear without exudates 


NECK: Trachea midline, full range of motion, supple. 


LUNGS: lung diminished posteriorly - on 2 liters of nasal cannula


HEART: Regular rate and rhythm 


ABDOMEN: Soft, nontender, nondistended, normoactive bowel sounds 


EXTREMITIES:   no edema. 


NEUROLOGICAL:  Normal speech, gait not observed.


PSYCH: Normal mood, normal affect.


                         Laboratory Results - last 24 hr











  09/01/20 09/02/20 09/03/20





  17:00 09:51 07:50


 


WBC    9.8


 


RBC    4.02


 


Hgb    12.9


 


Hct    38.7


 


MCV    96.3 H


 


MCH    32.0


 


MCHC    33.2


 


RDW    14.1


 


Plt Count    158


 


MPV    8.8


 


Absolute Neuts (auto)    6.7


 


Neutrophils %    68.3


 


Lymphocytes %    14.9  D


 


Monocytes %    15.0 H


 


Eosinophils %    1.3  D


 


Basophils %    0.5


 


Nucleated RBC %    0


 


Sodium   


 


Potassium   


 


Chloride   


 


Carbon Dioxide   


 


Anion Gap   


 


BUN   


 


Creatinine   


 


Est GFR (CKD-EPI)AfAm   


 


Est GFR (CKD-EPI)NonAf   


 


Random Glucose   


 


Calcium   6.9 L* 


 


Magnesium   


 


Total Bilirubin   


 


AST   


 


ALT   


 


Alkaline Phosphatase   


 


Creatine Kinase Index   0.6 


 


CK-MB (CK-2)   4.0 H 


 


Total Protein   


 


Albumin   


 


Hep A IgM Ab Confirm  Negative  


 


Hep Bs Antigen  Negative  


 


Hep B Core IgM Ab  Negative  


 


Hepatitis C Ab (EIA)  <0.1  














  09/03/20





  07:50


 


WBC 


 


RBC 


 


Hgb 


 


Hct 


 


MCV 


 


MCH 


 


MCHC 


 


RDW 


 


Plt Count 


 


MPV 


 


Absolute Neuts (auto) 


 


Neutrophils % 


 


Lymphocytes % 


 


Monocytes % 


 


Eosinophils % 


 


Basophils % 


 


Nucleated RBC % 


 


Sodium  144


 


Potassium  3.5


 


Chloride  113 H


 


Carbon Dioxide  22


 


Anion Gap  9


 


BUN  16.0


 


Creatinine  0.7


 


Est GFR (CKD-EPI)AfAm  103.30


 


Est GFR (CKD-EPI)NonAf  89.13


 


Random Glucose  96


 


Calcium  7.9 L


 


Magnesium  2.1


 


Total Bilirubin  0.7


 


AST  106 H


 


ALT  89 H


 


Alkaline Phosphatase  125 H


 


Creatine Kinase Index 


 


CK-MB (CK-2) 


 


Total Protein  5.5 L


 


Albumin  2.3 L


 


Hep A IgM Ab Confirm 


 


Hep Bs Antigen 


 


Hep B Core IgM Ab 


 


Hepatitis C Ab (EIA) 








Active Medications











Generic Name Dose Route Start Last Admin





  Trade Name Freq  PRN Reason Stop Dose Admin


 


Albuterol Sulfate  2 puff  08/29/20 16:11 





  Ventolin Hfa Inhaler -  IH  





  Q6H PRN  





  SHORTNESS OF BREATH  


 


Albuterol/Ipratropium  1 amp  08/30/20 13:05 





  Duoneb -  NEB  





  Q6H PRN  





  SHORTNESS OF BREATH  


 


Aspirin  81 mg  08/29/20 15:15  09/03/20 10:40





  Ecotrin -  PO   81 mg





  DAILY MARISSA   Administration


 


Atorvastatin Calcium  40 mg  08/29/20 22:00  09/02/20 22:37





  Lipitor -  PO   40 mg





  HS MARISSA   Administration


 


Calcium Carbonate  500 mg  09/02/20 12:15  09/03/20 10:40





  Os-Noah 500mg -  PO   500 mg





  BID MARISSA   Administration


 


Docusate Sodium  100 mg  09/01/20 22:00  09/03/20 06:54





  Colace -  PO   100 mg





  TID MARISSA   Administration


 


Enoxaparin Sodium  40 mg  08/29/20 10:00  09/03/20 10:41





  Lovenox -  SQ   40 mg





  DAILY MARISSA   Administration


 


Sodium Chloride  1,000 mls @ 75 mls/hr  08/29/20 00:30  09/03/20 06:56





  Normal Saline -  IV   75 mls/hr





  ASDIR MARISSA   Administration


 


Ceftriaxone Sodium 1 gm/  50 mls @ 100 mls/hr  08/29/20 22:00  09/02/20 22:37





  Dextrose  IVPB   100 mls/hr





  Q24H MARISSA   Administration





  Protocol  


 


Lisinopril  5 mg  09/02/20 10:00  09/03/20 10:40





  Prinivil  PO   5 mg





  DAILY MARISSA   Administration


 


Metoprolol Succinate  25 mg  08/29/20 15:15  09/03/20 10:40





  Toprol Xl -  PO   25 mg





  DAILY MARISSA   Administration


 


Multivitamins/Minerals/Vitamin C  1 tab  08/30/20 10:00  09/03/20 10:40





  Tab-A-Vit -  PO   1 tab





  DAILY MARISSA   Administration


 


Polyethylene Glycol  17 gm  08/30/20 10:00  09/03/20 10:40





  Miralax (For Daily Use) -  PO   Not Given





  DAILY MARISSA  











ASSESSMENT/PLAN:








Problem List





- Problems


(1) UTI due to Klebsiella species


Assessment/Plan: 


on ceftriaxone day 6


Code(s): N39.0 - URINARY TRACT INFECTION, SITE NOT SPECIFIED; B96.89 - OTH 

BACTERIAL AGENTS AS THE CAUSE OF DISEASES CLASSD [id number]   





(2) Acute metabolic encephalopathy


Assessment/Plan: 


head ct negative, blood cultures negative to date


UC + Kleb, on Ceftriaxone 1 gram daily


ID consulted for increased WBC, recommend to continue to monitor and if WBC 

increases, may consider switching antbiotic therapy


swallow eval showed need for aspiration precautions with nectar thick fluids


monitor mental status


Code(s): G93.41 - METABOLIC ENCEPHALOPATHY   





(3) CAD (coronary artery disease)


Assessment/Plan: 


on lipitor


Code(s): I25.10 - ATHSCL HEART DISEASE OF NATIVE CORONARY ARTERY W/O ANG PCTRS  




Qualifiers: 


   Coronary Disease-Associated Artery/Lesion type: native artery   Native vs. 

transplanted heart: native heart   Associated angina: without angina   Qualified

Code(s): I25.10 - Atherosclerotic heart disease of native coronary artery 

without angina pectoris   





(4) COPD (chronic obstructive pulmonary disease)


Assessment/Plan: 


mild wheezing of anterior upper lobes, diminished otherwise.  on 2 liters of 

nasal cannula


pulmonary consulted and following 


chest ct shows moderate to severe COPD. will start on symbicort.


Code(s): J44.9 - CHRONIC OBSTRUCTIVE PULMONARY DISEASE, UNSPECIFIED   


Qualifiers: 


   COPD type: unspecified COPD   Qualified Code(s): J44.9 - Chronic obstructive 

pulmonary disease, unspecified   





(5) Constipation


Assessment/Plan: 


bowel regimen ordered


Code(s): K59.00 - CONSTIPATION, UNSPECIFIED   





(6) Demand ischemia


Assessment/Plan: 


cardiology following


Code(s): I24.8 - OTHER FORMS OF ACUTE ISCHEMIC HEART DISEASE   





(7) Diastolic dysfunction


Assessment/Plan: 


no signs of volume overload.  cardiology following


lisinopril added


Code(s): I51.89 - OTHER ILL-DEFINED HEART DISEASES   





(8) Moderate protein-calorie malnutrition


Assessment/Plan: 


on supplemnts, RD following


Code(s): E44.0 - MODERATE PROTEIN-CALORIE MALNUTRITION   





(9) Prophylactic measure


Code(s): Z29.9 - ENCOUNTER FOR PROPHYLACTIC MEASURES, UNSPECIFIED   





(10) Rhabdomyolysis


Assessment/Plan: 


CPK improving, daily labs


continue ivf


Code(s): M62.82 - RHABDOMYOLYSIS   





(11) DVT prophylaxis


Assessment/Plan: 


on lovenox


Code(s): Z29.9 - ENCOUNTER FOR PROPHYLACTIC MEASURES, UNSPECIFIED   





(12) Fall


Assessment/Plan: 


physical therapy evaluation. patient for SNF placement


Code(s): W19.XXXA - UNSPECIFIED FALL, INITIAL ENCOUNTER   





Visit type





- Emergency Visit


Emergency Visit: Yes


ED Registration Date: 08/28/20


Care time: The patient presented to the Emergency Department on the above date 

and was hospitalized for further evaluation of their emergent condition.





- New Patient


This patient is new to me today: No





- Critical Care


Critical Care patient: No





- Discharge Referral


Referred to [facility] No





- Medication Review


Med list reviewed for High Risk Meds patients 65 and older: Yes

## 2020-09-03 NOTE — PN
Progress Note, Physician


History of Present Illness: 





pulmonary





awake,confused,-resp distress





- Current Medication List


Current Medications: 


Active Medications





Albuterol Sulfate (Ventolin Hfa Inhaler -)  2 puff IH Q6H PRN


   PRN Reason: SHORTNESS OF BREATH


Albuterol/Ipratropium (Duoneb -)  1 amp NEB Q6H PRN


   PRN Reason: SHORTNESS OF BREATH


Aspirin (Ecotrin -)  81 mg PO DAILY [facility]


   Last Admin: 09/02/20 13:14 Dose:  81 mg


   Documented by: 


Atorvastatin Calcium (Lipitor -)  40 mg PO HS [facility]


   Last Admin: 09/02/20 22:37 Dose:  40 mg


   Documented by: 


Calcium Carbonate (Os-Noah 500mg -)  500 mg PO BID [facility]


   Last Admin: 09/02/20 22:37 Dose:  500 mg


   Documented by: 


Docusate Sodium (Colace -)  100 mg PO TID [facility]


   Last Admin: 09/03/20 06:54 Dose:  100 mg


   Documented by: 


Enoxaparin Sodium (Lovenox -)  40 mg SQ DAILY [facility]


   Last Admin: 09/02/20 11:54 Dose:  40 mg


   Documented by: 


Sodium Chloride (Normal Saline -)  1,000 mls @ 75 mls/hr IV ASDIR [facility]


   Last Admin: 09/03/20 06:56 Dose:  75 mls/hr


   Documented by: 


Ceftriaxone Sodium 1 gm/ (Dextrose)  50 mls @ 100 mls/hr IVPB Q24H [facility]; Protocol


   Last Admin: 09/02/20 22:37 Dose:  100 mls/hr


   Documented by: 


Lisinopril (Prinivil)  5 mg PO DAILY [facility]


   Last Admin: 09/02/20 11:55 Dose:  5 mg


   Documented by: 


Metoprolol Succinate (Toprol Xl -)  25 mg PO DAILY [facility]


   Last Admin: 09/02/20 11:55 Dose:  25 mg


   Documented by: 


Multivitamins/Minerals/Vitamin C (Tab-A-Vit -)  1 tab PO DAILY [facility]


   Last Admin: 09/02/20 11:55 Dose:  1 tab


   Documented by: 


Polyethylene Glycol (Miralax (For Daily Use) -)  17 gm PO DAILY [facility]


   Last Admin: 09/02/20 11:49 Dose:  Not Given


   Documented by: 











- Objective


Vital Signs: 


                                   Vital Signs











Temperature  98.4 F   09/03/20 01:29


 


Pulse Rate  67   09/03/20 06:00


 


Respiratory Rate  20   09/03/20 06:00


 


Blood Pressure  150/66   09/03/20 06:00


 


O2 Sat by Pulse Oximetry (%)  93 L  09/02/20 22:00











Constitutional: Yes: Calm, Thin


Eyes: Yes: WNL


HENT: Yes: WNL


Neck: Yes: WNL


Cardiovascular: Yes: Regular Rate and Rhythm, S1, S2


Respiratory: Yes: Rales (few bibasilar crackles)


Gastrointestinal: Yes: Normal Bowel Sounds, Soft


Extremities: Yes: WNL


Edema: No


Labs: 


                                    CBC, BMP











INR  1.28  (0.83-1.09)  H  08/29/20  05:40    














- ....Imaging


Cat Scan: Report Reviewed, Image Reviewed





Problem List





- Problems


(1) Acute metabolic encephalopathy


Code(s): G93.41 - METABOLIC ENCEPHALOPATHY   





(2) CAD (coronary artery disease)


Code(s): I25.10 - ATHSCL HEART DISEASE OF NATIVE CORONARY ARTERY W/O ANG PCTRS  




Qualifiers: 


   Coronary Disease-Associated Artery/Lesion type: native artery   Native vs. 

transplanted heart: native heart   Associated angina: without angina   Qualified

Code(s): I25.10 - Atherosclerotic heart disease of native coronary artery 

without angina pectoris   





(3) COPD (chronic obstructive pulmonary disease)


Code(s): J44.9 - CHRONIC OBSTRUCTIVE PULMONARY DISEASE, UNSPECIFIED   


Qualifiers: 


   COPD type: unspecified COPD   Qualified Code(s): J44.9 - Chronic obstructive 

pulmonary disease, unspecified   





(4) Demand ischemia


Code(s): I24.8 - OTHER FORMS OF ACUTE ISCHEMIC HEART DISEASE   





(5) Diastolic dysfunction


Code(s): I51.89 - OTHER ILL-DEFINED HEART DISEASES   





(6) Fall


Code(s): W19.XXXA - UNSPECIFIED FALL, INITIAL ENCOUNTER   





(7) HTN (hypertension)


Code(s): I10 - ESSENTIAL (PRIMARY) HYPERTENSION   


Qualifiers: 


   Hypertension type: essential hypertension   Qualified Code(s): I10 - 

Essential (primary) hypertension   





(8) Hyperlipidemia


Code(s): E78.5 - HYPERLIPIDEMIA, UNSPECIFIED   


Qualifiers: 


   Hyperlipidemia type: pure hypercholesterolemia   Qualified Code(s): E78.00 - 

Pure hypercholesterolemia, unspecified; E78.0 - Pure hypercholesterolemia   





(9) UTI (urinary tract infection)


Code(s): N39.0 - URINARY TRACT INFECTION, SITE NOT SPECIFIED   


Qualifiers: 


   Urinary tract infection type: site unspecified   Hematuria presence: with 

hematuria   Qualified Code(s): N39.0 - Urinary tract infection, site not 

specified; R31.9 - Hematuria, unspecified   





(10) Weakness


Code(s): R53.1 - WEAKNESS   





Assessment/Plan








IMP  S/P FALL ?SYNCOPE


      R/O RLL INFILTRATE LIKELY ASPIRATION


      ALTERED MENTAL STATUS improving


      COPD  


      UTI


      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


      HTN


      ASHD


      + TROPONIN LIKELY DEMAND ISCHEMIA


      ELEVATED LFTS





PLAN  SUPPLEMENTAL O2


         INHALED BRONCHODILATORS


         ABX AS PER ID


        TREND LFTS,TROPONINS


        MONITOR LYTES


        CHEST CT


        ASPIRATION PRECAUTIONS





DR MONTEJO





 Problem List 





- Problems


(1) Acute metabolic encephalopathy


Code(s): G93.41 - METABOLIC ENCEPHALOPATHY   





(2) CAD (coronary artery disease)


Code(s): I25.10 - ATHSCL HEART DISEASE OF NATIVE CORONARY ARTERY W/O ANG PCTRS  




Qualifiers: 


   Coronary Disease-Associated Artery/Lesion type: native artery   Native vs. 

transplanted heart: native heart   Associated angina: without angina   Qualified

Code(s): I25.10 - Atherosclerotic heart disease of native coronary artery 

without angina pectoris   





(3) COPD (chronic obstructive pulmonary disease)


Code(s): J44.9 - CHRONIC OBSTRUCTIVE PULMONARY DISEASE, UNSPECIFIED   


Qualifiers: 


   COPD type: unspecified COPD   Qualified Code(s): J44.9 - Chronic obstructive 

pulmonary disease, unspecified   





(4) Demand ischemia


Code(s): I24.8 - OTHER FORMS OF ACUTE ISCHEMIC HEART DISEASE   





(5) Diastolic dysfunction


Code(s): I51.89 - OTHER ILL-DEFINED HEART DISEASES   





(6) Fall


Code(s): W19.XXXA - UNSPECIFIED FALL, INITIAL ENCOUNTER   





(7) HTN (hypertension)


Code(s): I10 - ESSENTIAL (PRIMARY) HYPERTENSION   


Qualifiers: 


   Hypertension type: essential hypertension   Qualified Code(s): I10 - 

Essential (primary) hypertension   





(8) Hyperlipidemia


Code(s): E78.5 - HYPERLIPIDEMIA, UNSPECIFIED   


Qualifiers: 


   Hyperlipidemia type: pure hypercholesterolemia   Qualified Code(s): E78.00 - 

Pure hypercholesterolemia, unspecified; E78.0 - Pure hypercholesterolemia   





(9) UTI (urinary tract infection)


Code(s): N39.0 - URINARY TRACT INFECTION, SITE NOT SPECIFIED   


Qualifiers: 


   Urinary tract infection type: site unspecified   Hematuria presence: with 

hematuria   Qualified Code(s): N39.0 - Urinary tract infection, site not 

specified; R31.9 - Hematuria, unspecified   





(10) Weakness


Code(s): R53.1 - WEAKNESS

## 2020-09-03 NOTE — PN
Progress Note, Physician


History of Present Illness: 


No events on telemetry, denies recurrent near or true syncope. Afebrile.








- Current Medication List


Current Medications: 


Active Medications





Albuterol Sulfate (Ventolin Hfa Inhaler -)  2 puff IH Q6H PRN


   PRN Reason: SHORTNESS OF BREATH


Albuterol/Ipratropium (Duoneb -)  1 amp NEB Q6H PRN


   PRN Reason: SHORTNESS OF BREATH


Aspirin (Ecotrin -)  81 mg PO DAILY [facility]


   Last Admin: 09/02/20 13:14 Dose:  81 mg


   Documented by: 


Atorvastatin Calcium (Lipitor -)  40 mg PO HS [facility]


   Last Admin: 09/02/20 22:37 Dose:  40 mg


   Documented by: 


Calcium Carbonate (Os-Noah 500mg -)  500 mg PO BID [facility]


   Last Admin: 09/02/20 22:37 Dose:  500 mg


   Documented by: 


Docusate Sodium (Colace -)  100 mg PO TID [facility]


   Last Admin: 09/03/20 06:54 Dose:  100 mg


   Documented by: 


Enoxaparin Sodium (Lovenox -)  40 mg SQ DAILY [facility]


   Last Admin: 09/02/20 11:54 Dose:  40 mg


   Documented by: 


Sodium Chloride (Normal Saline -)  1,000 mls @ 75 mls/hr IV ASDIR [facility]


   Last Admin: 09/03/20 06:56 Dose:  75 mls/hr


   Documented by: 


Ceftriaxone Sodium 1 gm/ (Dextrose)  50 mls @ 100 mls/hr IVPB Q24H [facility]; Protocol


   Last Admin: 09/02/20 22:37 Dose:  100 mls/hr


   Documented by: 


Lisinopril (Prinivil)  5 mg PO DAILY [facility]


   Last Admin: 09/02/20 11:55 Dose:  5 mg


   Documented by: 


Metoprolol Succinate (Toprol Xl -)  25 mg PO DAILY [facility]


   Last Admin: 09/02/20 11:55 Dose:  25 mg


   Documented by: 


Multivitamins/Minerals/Vitamin C (Tab-A-Vit -)  1 tab PO DAILY [facility]


   Last Admin: 09/02/20 11:55 Dose:  1 tab


   Documented by: 


Polyethylene Glycol (Miralax (For Daily Use) -)  17 gm PO DAILY [facility]


   Last Admin: 09/02/20 11:49 Dose:  Not Given


   Documented by: 











- Objective


Vital Signs: 


                                   Vital Signs











Temperature  98.4 F   09/03/20 01:29


 


Pulse Rate  67   09/03/20 06:00


 


Respiratory Rate  20   09/03/20 06:00


 


Blood Pressure  150/66   09/03/20 06:00


 


O2 Sat by Pulse Oximetry (%)  93 L  09/02/20 22:00











Constitutional: Yes: No Distress, Calm


Neck: Yes: Supple


Cardiovascular: Yes: Regular Rate and Rhythm


Respiratory: Yes: Regular, CTA Bilaterally


Gastrointestinal: Yes: Normal Bowel Sounds, Soft


Edema: No


Labs: 


                                    CBC, BMP





                                 09/03/20 07:50 





                                 09/03/20 07:50 





                                    INR, PTT











INR  1.28  (0.83-1.09)  H  08/29/20  05:40    














- ....Imaging


EKG: Report Reviewed (Tele: NSR PVC)





Problem List





- Problems


(1) CAD (coronary artery disease)


Code(s): I25.10 - ATHSCL HEART DISEASE OF NATIVE CORONARY ARTERY W/O ANG PCTRS  




Qualifiers: 


   Coronary Disease-Associated Artery/Lesion type: native artery   Native vs. 

transplanted heart: native heart   Associated angina: without angina   Qualified

Code(s): I25.10 - Atherosclerotic heart disease of native coronary artery 

without angina pectoris   





(2) COPD (chronic obstructive pulmonary disease)


Code(s): J44.9 - CHRONIC OBSTRUCTIVE PULMONARY DISEASE, UNSPECIFIED   


Qualifiers: 


   COPD type: unspecified COPD   Qualified Code(s): J44.9 - Chronic obstructive 

pulmonary disease, unspecified   





(3) HTN (hypertension)


Code(s): I10 - ESSENTIAL (PRIMARY) HYPERTENSION   


Qualifiers: 


   Hypertension type: essential hypertension   Qualified Code(s): I10 - 

Essential (primary) hypertension   





(4) Diastolic dysfunction


Code(s): I51.89 - OTHER ILL-DEFINED HEART DISEASES   





(5) Demand ischemia


Code(s): I24.8 - OTHER FORMS OF ACUTE ISCHEMIC HEART DISEASE   





(6) Hyperlipidemia


Code(s): E78.5 - HYPERLIPIDEMIA, UNSPECIFIED   


Qualifiers: 


   Hyperlipidemia type: pure hypercholesterolemia   Qualified Code(s): E78.00 - 

Pure hypercholesterolemia, unspecified; E78.0 - Pure hypercholesterolemia   





Assessment/Plan


08/31/2020 Echo: Normal biventricular size and fxn, mild MR, TR, abnl LV 

compliance





1.  Unwitnessed fall unclear mechanical versus a syncopal episode


2.  Coronary artery disease post percutaneous coronary intervention with 

evidence of demand ischemic injury angina pectoris (Patient has not had coronary

artery bypass graft surgery as stated in the notes)


3.  Diastolic left ventricular dysfunction with class 0 New [employer] Heart 

Association classification left ventricular failure


4.  Hypertensive cardiovascular disease


5.  Hypercholesterolemia


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


7.  History of lung surgery partial resection


8.  History of chronic obstructive pulmonary disease


9.  Urinary tract infection


10.  Chronic kidney disease


11.  Abnormal liver function testing


12. Fever on empiric abx





PLAN:





1.  Continue Toprol-XL 25 qd and lisinopril 5 qd with dose titration as 

needed/as tolerated, troponins have plateaued


2.  Continue Lipitor 40 qd


3.  Resume daily Ecotrin 81 qd, replete K


4.  Antibiotics as per the primary team


5.  PT->gait training, DVT prophylaxis, d/c IVF, dysphagia diet, d/c telemetry

## 2020-09-04 LAB
ALBUMIN SERPL-MCNC: 2.2 G/DL (ref 3.4–5)
ALP SERPL-CCNC: 116 U/L (ref 45–117)
ALT SERPL-CCNC: 85 U/L (ref 13–61)
ANION GAP SERPL CALC-SCNC: 8 MMOL/L (ref 8–16)
AST SERPL-CCNC: 100 U/L (ref 15–37)
BASOPHILS # BLD: 0.8 % (ref 0–2)
BILIRUB SERPL-MCNC: 0.8 MG/DL (ref 0.2–1)
BUN SERPL-MCNC: 14 MG/DL (ref 7–18)
CALCIUM SERPL-MCNC: 7.6 MG/DL (ref 8.5–10.1)
CHLORIDE SERPL-SCNC: 113 MMOL/L (ref 98–107)
CO2 SERPL-SCNC: 24 MMOL/L (ref 21–32)
CREAT SERPL-MCNC: 0.6 MG/DL (ref 0.55–1.3)
DEPRECATED RDW RBC AUTO: 14.5 % (ref 11.9–15.9)
EOSINOPHIL # BLD: 2.5 % (ref 0–4.5)
GLUCOSE SERPL-MCNC: 83 MG/DL (ref 74–106)
HCT VFR BLD CALC: 38.8 % (ref 35.4–49)
HGB BLD-MCNC: 12.7 GM/DL (ref 11.7–16.9)
LYMPHOCYTES # BLD: 19.2 % (ref 8–40)
MAGNESIUM SERPL-MCNC: 2 MG/DL (ref 1.8–2.4)
MCH RBC QN AUTO: 31.7 PG (ref 25.7–33.7)
MCHC RBC AUTO-ENTMCNC: 32.9 G/DL (ref 32–35.9)
MCV RBC: 96.4 FL (ref 80–96)
MONOCYTES # BLD AUTO: 12.3 % (ref 3.8–10.2)
NEUTROPHILS # BLD: 65.2 % (ref 42.8–82.8)
PLATELET # BLD AUTO: 191 K/MM3 (ref 134–434)
PMV BLD: 9.4 FL (ref 7.5–11.1)
POTASSIUM SERPLBLD-SCNC: 3.5 MMOL/L (ref 3.5–5.1)
PROT SERPL-MCNC: 5.4 G/DL (ref 6.4–8.2)
RBC # BLD AUTO: 4.02 M/MM3 (ref 4–5.6)
SODIUM SERPL-SCNC: 145 MMOL/L (ref 136–145)
WBC # BLD AUTO: 8.4 K/MM3 (ref 4–10)

## 2020-09-04 RX ADMIN — BUDESONIDE AND FORMOTEROL FUMARATE DIHYDRATE SCH PUFF: 80; 4.5 AEROSOL RESPIRATORY (INHALATION) at 10:44

## 2020-09-04 RX ADMIN — POLYETHYLENE GLYCOL 3350 SCH GM: 17 POWDER, FOR SOLUTION ORAL at 10:36

## 2020-09-04 RX ADMIN — MULTIVITAMIN TABLET SCH TAB: TABLET at 10:29

## 2020-09-04 RX ADMIN — CALCIUM SCH MG: 500 TABLET ORAL at 22:51

## 2020-09-04 RX ADMIN — LISINOPRIL SCH MG: 5 TABLET ORAL at 10:35

## 2020-09-04 RX ADMIN — DOCUSATE SODIUM SCH: 100 CAPSULE, LIQUID FILLED ORAL at 22:52

## 2020-09-04 RX ADMIN — CALCIUM SCH MG: 500 TABLET ORAL at 10:30

## 2020-09-04 RX ADMIN — ASPIRIN SCH MG: 81 TABLET, COATED ORAL at 10:30

## 2020-09-04 RX ADMIN — POLYETHYLENE GLYCOL 3350 SCH: 17 POWDER, FOR SOLUTION ORAL at 10:49

## 2020-09-04 RX ADMIN — DOCUSATE SODIUM SCH: 100 CAPSULE, LIQUID FILLED ORAL at 05:53

## 2020-09-04 RX ADMIN — ENOXAPARIN SODIUM SCH MG: 40 INJECTION SUBCUTANEOUS at 10:29

## 2020-09-04 RX ADMIN — BUDESONIDE AND FORMOTEROL FUMARATE DIHYDRATE SCH PUFF: 80; 4.5 AEROSOL RESPIRATORY (INHALATION) at 22:52

## 2020-09-04 RX ADMIN — DOCUSATE SODIUM SCH: 100 CAPSULE, LIQUID FILLED ORAL at 14:21

## 2020-09-04 RX ADMIN — CEFTRIAXONE SCH MLS/HR: 1 INJECTION, POWDER, FOR SOLUTION INTRAMUSCULAR; INTRAVENOUS at 22:52

## 2020-09-04 NOTE — PN
Physical Exam: 


SUBJECTIVE: Patient seen and examined. was restless earlier today, now calmer.  





OBJECTIVE:


Patient is an 79 year old male with a significant past medical history of COPD, 

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

a fall.  on admission patient also noted to have toxic metabolic encephalopathy 

with moderate protein calorie malnutrition.   He was found to have Klebsiella 

UTI and on ceftriaxone.  


------


problem list:


impaired swallowing


rhabdo


early lung infiltrate


klebsiella UTI


moderate protein calorie malnutrition


elevated liver enzymes


------


covid status: negative as of 8/29/2020


                                        








 Period  Temp  Pulse  Resp  BP Sys/Perez  Pulse Ox


 


 Last 24 Hr  97.2 F-99 F  61-72  18-20  127-149/50-77  92-98





 


GENERAL: The patient is awake, forgetful, confused but can tell me his name and 

his sons name, aox 1  (not year or place)


HEAD: Normal with no signs of trauma.


EYES: PERRL, extraocular movements intact, sclera anicteric, conjunctiva clear. 

No ptosis. 


ENT: Ears normal, nares patent, oropharynx clear without exudates 


NECK: Trachea midline, full range of motion, supple. 


LUNGS: lung diminished posteriorly - on 2 liters of nasal cannula


HEART: Regular rate and rhythm 


ABDOMEN: Soft, nontender, nondistended, normoactive bowel sounds 


EXTREMITIES:   no edema. 


NEUROLOGICAL:  Normal speech, gait not observed.


PSYCH: Normal mood, normal affect.


                         Laboratory Results - last 24 hr











  09/01/20 09/03/20 09/03/20





  17:00 07:50 13:50


 


WBC   


 


RBC   


 


Hgb   


 


Hct   


 


MCV   


 


MCH   


 


MCHC   


 


RDW   


 


Plt Count   


 


MPV   


 


Absolute Neuts (auto)   


 


Neutrophils %   


 


Lymphocytes %   


 


Monocytes %   


 


Eosinophils %   


 


Basophils %   


 


Nucleated RBC %   


 


Sodium   144 


 


Potassium   3.5 


 


Chloride   113 H 


 


Carbon Dioxide   22 


 


Anion Gap   9 


 


BUN   16.0 


 


Creatinine   0.7 


 


Est GFR (CKD-EPI)AfAm   103.30 


 


Est GFR (CKD-EPI)NonAf   89.13 


 


Random Glucose   96 


 


Calcium   7.9 L 


 


Magnesium   2.1 


 


Total Bilirubin   0.7 


 


AST   106 H 


 


ALT   89 H 


 


Alkaline Phosphatase   125 H 


 


Creatine Kinase   453 H  < 7 L


 


Creatine Kinase Index   0.8 


 


CK-MB (CK-2)   3.9 H 


 


Total Protein   5.5 L 


 


Albumin   2.3 L 


 


Hep A IgM Ab Confirm  Negative  


 


Hepatitis A Ab Total  Positive H  


 


Hep Bs Antigen  Negative  


 


Hep Bs Antibody  Non reactive  


 


Hep B Core Total Ab  Negative  


 


Hep B Core IgM Ab  Negative  


 


Hepatitis Be Antibody  Negative  


 


Hepatitis Be Antigen  Negative  














  09/04/20 09/04/20





  07:49 07:49


 


WBC  8.4 


 


RBC  4.02 


 


Hgb  12.7 


 


Hct  38.8 


 


MCV  96.4 H 


 


MCH  31.7 


 


MCHC  32.9 


 


RDW  14.5 


 


Plt Count  191  D 


 


MPV  9.4 


 


Absolute Neuts (auto)  5.5 


 


Neutrophils %  65.2 


 


Lymphocytes %  19.2  D 


 


Monocytes %  12.3 H 


 


Eosinophils %  2.5  D 


 


Basophils %  0.8 


 


Nucleated RBC %  0 


 


Sodium   145


 


Potassium   3.5


 


Chloride   113 H


 


Carbon Dioxide   24


 


Anion Gap   8


 


BUN   14.0


 


Creatinine   0.6


 


Est GFR (CKD-EPI)AfAm   110.06


 


Est GFR (CKD-EPI)NonAf   94.96


 


Random Glucose   83


 


Calcium   7.6 L


 


Magnesium   2.0


 


Total Bilirubin   0.8


 


AST   100 H


 


ALT   85 H


 


Alkaline Phosphatase   116


 


Creatine Kinase   407 H


 


Creatine Kinase Index   1.0


 


CK-MB (CK-2)   4.2 H


 


Total Protein   5.4 L


 


Albumin   2.2 L


 


Hep A IgM Ab Confirm  


 


Hepatitis A Ab Total  


 


Hep Bs Antigen  


 


Hep Bs Antibody  


 


Hep B Core Total Ab  


 


Hep B Core IgM Ab  


 


Hepatitis Be Antibody  


 


Hepatitis Be Antigen  








Active Medications











Generic Name Dose Route Start Last Admin





  Trade Name Freq  PRN Reason Stop Dose Admin


 


Albuterol/Ipratropium  1 amp  09/03/20 20:10 





  Duoneb -  NEB  





  Q6H PRN  





  SHORTNESS OF BREATH  


 


Aspirin  81 mg  09/04/20 10:00  09/04/20 10:30





  Ecotrin -  PO   81 mg





  DAILY MARISSA   Administration


 


Atorvastatin Calcium  40 mg  09/03/20 22:00  09/03/20 21:47





  Lipitor -  PO   40 mg





  HS MARISSA   Administration


 


Budesonide/Formoterol Fumarate  2 puff  09/03/20 22:00  09/04/20 10:44





  Symbicort 80/4.5mcg -  IH   2 puff





  BID MARISSA   Administration


 


Calcium Carbonate  500 mg  09/03/20 22:00  09/04/20 10:30





  Os-Noah 500mg -  PO   500 mg





  BID MARISSA   Administration


 


Docusate Sodium  100 mg  09/03/20 22:00  09/04/20 05:53





  Colace -  PO   Not Given





  TID MARISSA  


 


Enoxaparin Sodium  40 mg  09/04/20 10:00  09/04/20 10:29





  Lovenox -  SQ   40 mg





  DAILY MARISSA   Administration


 


Ceftriaxone Sodium 1 gm/  50 mls @ 100 mls/hr  09/03/20 22:00  09/03/20 21:47





  Dextrose  IVPB   100 mls/hr





  Q24H MARISSA   Administration





  Protocol  


 


Lisinopril  5 mg  09/04/20 10:00  09/04/20 10:35





  Prinivil  PO   5 mg





  DAILY MARISSA   Administration


 


Metoprolol Succinate  25 mg  09/04/20 10:00  09/04/20 10:30





  Toprol Xl -  PO   25 mg





  DAILY MARISSA   Administration


 


Multivitamins/Minerals/Vitamin C  1 tab  09/04/20 10:00  09/04/20 10:29





  Tab-A-Vit -  PO   1 tab





  DAILY MARISSA   Administration


 


Polyethylene Glycol  17 gm  09/04/20 10:00  09/04/20 10:49





  Miralax (For Daily Use) -  PO   Not Given





  DAILY MARISSA  











ASSESSMENT/PLAN:








Problem List





- Problems


(1) UTI due to Klebsiella species


Assessment/Plan: 


on ceftriaxone day 7. continue Iv per ID


Code(s): N39.0 - URINARY TRACT INFECTION, SITE NOT SPECIFIED; B96.89 - OTH 

BACTERIAL AGENTS AS THE CAUSE OF DISEASES CLASSD [id number]   





(2) Acute metabolic encephalopathy


Assessment/Plan: 


head ct negative, blood cultures negative to date


UC + Kleb, on Ceftriaxone 1 gram daily


ID consulted for increased WBC, recommend to continue to monitor and if WBC 

increases, may consider switching antbiotic therapy


swallow eval showed need for aspiration precautions with nectar thick fluids


monitor mental status


Code(s): G93.41 - METABOLIC ENCEPHALOPATHY   





(3) CAD (coronary artery disease)


Assessment/Plan: 


on lipitor


Code(s): I25.10 - ATHSCL HEART DISEASE OF NATIVE CORONARY ARTERY W/O ANG PCTRS  




Qualifiers: 


   Coronary Disease-Associated Artery/Lesion type: native artery   Native vs. 

transplanted heart: native heart   Associated angina: without angina   Qualified

Code(s): I25.10 - Atherosclerotic heart disease of native coronary artery 

without angina pectoris   





(4) COPD (chronic obstructive pulmonary disease)


Assessment/Plan: 


mild wheezing of anterior upper lobes, diminished otherwise.  on 2 liters of 

nasal cannula


pulmonary consulted and following 


chest ct shows moderate to severe COPD. will start on symbicort.


Code(s): J44.9 - CHRONIC OBSTRUCTIVE PULMONARY DISEASE, UNSPECIFIED   


Qualifiers: 


   COPD type: unspecified COPD   Qualified Code(s): J44.9 - Chronic obstructive 

pulmonary disease, unspecified   





(5) Constipation


Assessment/Plan: 


bowel regimen ordered


Code(s): K59.00 - CONSTIPATION, UNSPECIFIED   





(6) Demand ischemia


Assessment/Plan: 


cardiology following


Code(s): I24.8 - OTHER FORMS OF ACUTE ISCHEMIC HEART DISEASE   





(7) Diastolic dysfunction


Assessment/Plan: 


no signs of volume overload.  cardiology following


lisinopril added


Code(s): I51.89 - OTHER ILL-DEFINED HEART DISEASES   





(8) Moderate protein-calorie malnutrition


Assessment/Plan: 


on supplemnts, RD following


Code(s): E44.0 - MODERATE PROTEIN-CALORIE MALNUTRITION   





(9) Prophylactic measure


Code(s): Z29.9 - ENCOUNTER FOR PROPHYLACTIC MEASURES, UNSPECIFIED   





(10) Rhabdomyolysis


Assessment/Plan: 


CPK improving, daily labs.


Code(s): M62.82 - RHABDOMYOLYSIS   





(11) Fall


Assessment/Plan: 


physical therapy evaluation. patient for SNF placement


Code(s): W19.XXXA - UNSPECIFIED FALL, INITIAL ENCOUNTER   





(12) Elevated liver enzymes


Assessment/Plan: 


per ultrasound, mild fatty infiltration of the liver noted.


abdominal aortic anyeursm wit stent and possible gallbladder polyp


liver enzymes trending down


Code(s): R74.8 - ABNORMAL LEVELS OF OTHER SERUM ENZYMES   





(13) DVT prophylaxis


Assessment/Plan: 


on lovenox


Code(s): Z29.9 - ENCOUNTER FOR PROPHYLACTIC MEASURES, UNSPECIFIED   





Visit type





- Emergency Visit


Emergency Visit: Yes


ED Registration Date: 08/28/20


Care time: The patient presented to the Emergency Department on the above date 

and was hospitalized for further evaluation of their emergent condition.





- New Patient


This patient is new to me today: No





- Critical Care


Critical Care patient: No





- Discharge Referral


Referred to [facility] No





- Medication Review


Med list reviewed for High Risk Meds patients 65 and older: Yes

## 2020-09-04 NOTE — PN
Progress Note, Physician


History of Present Illness: 


Denies recurrent near or true syncope. Afebrile.








- Current Medication List


Current Medications: 


Active Medications





Albuterol/Ipratropium (Duoneb -)  1 amp NEB Q6H PRN


   PRN Reason: SHORTNESS OF BREATH


Aspirin (Ecotrin -)  81 mg PO DAILY [facility]


Atorvastatin Calcium (Lipitor -)  40 mg PO HS [facility]


   Last Admin: 09/03/20 21:47 Dose:  40 mg


   Documented by: 


Budesonide/Formoterol Fumarate (Symbicort 80/4.5mcg -)  2 puff IH BID [facility]


   Last Admin: 09/03/20 21:47 Dose:  2 puff


   Documented by: 


Calcium Carbonate (Os-Noah 500mg -)  500 mg PO BID [facility]


   Last Admin: 09/03/20 21:47 Dose:  500 mg


   Documented by: 


Docusate Sodium (Colace -)  100 mg PO TID [facility]


   Last Admin: 09/04/20 05:53 Dose:  Not Given


   Documented by: 


Enoxaparin Sodium (Lovenox -)  40 mg SQ DAILY [facility]


Ceftriaxone Sodium 1 gm/ (Dextrose)  50 mls @ 100 mls/hr IVPB Q24H [facility]; Protocol


   Last Admin: 09/03/20 21:47 Dose:  100 mls/hr


   Documented by: 


Lisinopril (Prinivil)  5 mg PO DAILY [facility]


Metoprolol Succinate (Toprol Xl -)  25 mg PO DAILY [facility]


Multivitamins/Minerals/Vitamin C (Tab-A-Vit -)  1 tab PO DAILY [facility]


Polyethylene Glycol (Miralax (For Daily Use) -)  17 gm PO DAILY [facility]











- Objective


Vital Signs: 


                                   Vital Signs











Temperature  98.8 F   09/04/20 06:20


 


Pulse Rate  61   09/04/20 06:20


 


Respiratory Rate  20   09/04/20 06:20


 


Blood Pressure  137/55 L  09/04/20 06:20


 


O2 Sat by Pulse Oximetry (%)  95   09/04/20 06:20











Constitutional: Yes: No Distress, Calm, Thin


Neck: Yes: Supple


Cardiovascular: Yes: Regular Rate and Rhythm


Respiratory: Yes: Regular, CTA Bilaterally


Gastrointestinal: Yes: Normal Bowel Sounds, Soft


Edema: No


Labs: 


                                    INR, PTT











INR  1.28  (0.83-1.09)  H  08/29/20  05:40    














Problem List





- Problems


(1) CAD (coronary artery disease)


Code(s): I25.10 - ATHSCL HEART DISEASE OF NATIVE CORONARY ARTERY W/O ANG PCTRS  




Qualifiers: 


   Coronary Disease-Associated Artery/Lesion type: native artery   Native vs. 

transplanted heart: native heart   Associated angina: without angina   Qualified

Code(s): I25.10 - Atherosclerotic heart disease of native coronary artery 

without angina pectoris   





(2) COPD (chronic obstructive pulmonary disease)


Code(s): J44.9 - CHRONIC OBSTRUCTIVE PULMONARY DISEASE, UNSPECIFIED   


Qualifiers: 


   COPD type: unspecified COPD   Qualified Code(s): J44.9 - Chronic obstructive 

pulmonary disease, unspecified   





(3) HTN (hypertension)


Code(s): I10 - ESSENTIAL (PRIMARY) HYPERTENSION   


Qualifiers: 


   Hypertension type: essential hypertension   Qualified Code(s): I10 - E

ssential (primary) hypertension   





(4) Diastolic dysfunction


Code(s): I51.89 - OTHER ILL-DEFINED HEART DISEASES   





(5) Demand ischemia


Code(s): I24.8 - OTHER FORMS OF ACUTE ISCHEMIC HEART DISEASE   





(6) Hyperlipidemia


Code(s): E78.5 - HYPERLIPIDEMIA, UNSPECIFIED   


Qualifiers: 


   Hyperlipidemia type: pure hypercholesterolemia   Qualified Code(s): E78.00 - 

Pure hypercholesterolemia, unspecified; E78.0 - Pure hypercholesterolemia   





Assessment/Plan


08/31/2020 Echo: Normal biventricular size and fxn, mild MR, TR, abnl LV 

compliance





1.  Unwitnessed fall unclear mechanical versus a syncopal episode


2.  Coronary artery disease post percutaneous coronary intervention with 

evidence of demand ischemic injury angina pectoris (Patient has not had coronary

artery bypass graft surgery as stated in the notes)


3.  Diastolic left ventricular dysfunction with class 0 [employer] [place] Heart 

Association classification left ventricular failure


4.  Hypertensive cardiovascular disease


5.  Hypercholesterolemia


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


7.  History of lung surgery partial resection


8.  History of chronic obstructive pulmonary disease


9.  Urinary tract infection


10.  Chronic kidney disease


11.  Abnormal liver function testing


12. Fever on empiric abx





PLAN:





1.  Continue Toprol-XL 25 qd and lisinopril 5 qd with dose titration as 

needed/as tolerated, troponins have plateaued


2.  Continue Lipitor 40 qd and Ecotrin 81 qd, repleted K


3.  Antibiotics as per the primary team


4.  PT->gait training, DVT prophylaxis, dysphagia diet

## 2020-09-04 NOTE — PN
Progress Note, SLP





- Note


Progress Note: 





Suspected silent aspiration confirmed on MBS.





                                Selected Entries











  09/02/20 09/02/20 09/03/20





  10:00 18:00 01:29


 


Breakfast 25%  


 


Lunch 50%  


 


Supper  25% 


 


Temperature   98.4 F


 


Pulse Rate   80


 


Blood Pressure   139/76














  09/03/20





  06:00


 


Breakfast 


 


Lunch 


 


Supper 


 


Temperature 


 


Pulse Rate 67


 


Blood Pressure 150/66








                                Laboratory Tests











  09/03/20





  07:50


 


WBC  9.8





                                Selected Entries











  09/03/20 09/03/20 09/04/20





  10:52 18:00 03:00


 


Breakfast 25%  


 


Diet Tolerated  Poor 


 


Supper  25% 


 


Temperature   99 F


 


Pulse Rate   66


 


Blood Pressure   144/66


 


O2 Sat by Pulse   92 L





Oximetry (%)   














  09/04/20





  06:20


 


Breakfast 


 


Diet Tolerated 


 


Supper 


 


Temperature 98.8 F


 


Pulse Rate 61


 


Blood Pressure 137/55 L


 


O2 Sat by Pulse 95





Oximetry (%) 








                                Laboratory Tests











  09/04/20





  07:49


 


WBC  8.4











Continue puree/nectar thick liquid








Medication Administration: Crushed with applesauce





Supplement: Magic Cup, Ensure Pudding, Other (2 Reina HN)





Aspiration precautions sign and recommendation sign posted for staff education





Small Bites, Chin Tuck/Down, Clear Pocket Food, Facilitative Feeding, Safe Rate,

1/2 tsp. at a time, Elevate HOB during feed, Other (tell pt to swallow, palpate 

larynx for reflex)





Reviewed case with NP, who suspects Parkinsons. Neuro consult placed

## 2020-09-04 NOTE — PN
Progress Note, Physician


History of Present Illness: 





PULMONARY








AWAKE,CONFUSED,-RESP DISTRESS





- Current Medication List


Current Medications: 


Active Medications





Albuterol/Ipratropium (Duoneb -)  1 amp NEB Q6H PRN


   PRN Reason: SHORTNESS OF BREATH


Aspirin (Ecotrin -)  81 mg PO DAILY [facility]


Atorvastatin Calcium (Lipitor -)  40 mg PO HS [facility]


   Last Admin: 09/03/20 21:47 Dose:  40 mg


   Documented by: 


Budesonide/Formoterol Fumarate (Symbicort 80/4.5mcg -)  2 puff IH BID [facility]


   Last Admin: 09/03/20 21:47 Dose:  2 puff


   Documented by: 


Calcium Carbonate (Os-Noah 500mg -)  500 mg PO BID [facility]


   Last Admin: 09/03/20 21:47 Dose:  500 mg


   Documented by: 


Docusate Sodium (Colace -)  100 mg PO TID [facility]


   Last Admin: 09/04/20 05:53 Dose:  Not Given


   Documented by: 


Enoxaparin Sodium (Lovenox -)  40 mg SQ DAILY [facility]


Ceftriaxone Sodium 1 gm/ (Dextrose)  50 mls @ 100 mls/hr IVPB Q24H [facility]; Protocol


   Last Admin: 09/03/20 21:47 Dose:  100 mls/hr


   Documented by: 


Lisinopril (Prinivil)  5 mg PO DAILY [facility]


Metoprolol Succinate (Toprol Xl -)  25 mg PO DAILY [facility]


Multivitamins/Minerals/Vitamin C (Tab-A-Vit -)  1 tab PO DAILY [facility]


Polyethylene Glycol (Miralax (For Daily Use) -)  17 gm PO DAILY [facility]











- Objective


Vital Signs: 


                                   Vital Signs











Temperature  98.8 F   09/04/20 06:20


 


Pulse Rate  61   09/04/20 06:20


 


Respiratory Rate  20   09/04/20 06:20


 


Blood Pressure  137/55 L  09/04/20 06:20


 


O2 Sat by Pulse Oximetry (%)  95   09/04/20 06:20











Constitutional: Yes: Calm, Thin


Eyes: Yes: WNL


HENT: Yes: WNL


Neck: Yes: WNL


Cardiovascular: Yes: Regular Rate and Rhythm, S1, S2


Respiratory: Yes: Rhonchi (FEW SCATTERED RHONCHI)


Gastrointestinal: Yes: Normal Bowel Sounds, Soft


Extremities: Yes: WNL


Edema: No


Labs: 


                                    CBC, BMP








Problem List





- Problems


(1) Acute metabolic encephalopathy


Code(s): G93.41 - METABOLIC ENCEPHALOPATHY   





(2) CAD (coronary artery disease)


Code(s): I25.10 - ATHSCL HEART DISEASE OF NATIVE CORONARY ARTERY W/O ANG PCTRS  




Qualifiers: 


   Coronary Disease-Associated Artery/Lesion type: native artery   Native vs. 

transplanted heart: native heart   Associated angina: without angina   Qualified

Code(s): I25.10 - Atherosclerotic heart disease of native coronary artery 

without angina pectoris   





(3) COPD (chronic obstructive pulmonary disease)


Code(s): J44.9 - CHRONIC OBSTRUCTIVE PULMONARY DISEASE, UNSPECIFIED   


Qualifiers: 


   COPD type: unspecified COPD   Qualified Code(s): J44.9 - Chronic obstructive 

pulmonary disease, unspecified   





(4) Demand ischemia


Code(s): I24.8 - OTHER FORMS OF ACUTE ISCHEMIC HEART DISEASE   





(5) Diastolic dysfunction


Code(s): I51.89 - OTHER ILL-DEFINED HEART DISEASES   





(6) Fall


Code(s): W19.XXXA - UNSPECIFIED FALL, INITIAL ENCOUNTER   





(7) HTN (hypertension)


Code(s): I10 - ESSENTIAL (PRIMARY) HYPERTENSION   


Qualifiers: 


   Hypertension type: essential hypertension   Qualified Code(s): I10 - 

Essential (primary) hypertension   





(8) Hyperlipidemia


Code(s): E78.5 - HYPERLIPIDEMIA, UNSPECIFIED   


Qualifiers: 


   Hyperlipidemia type: pure hypercholesterolemia   Qualified Code(s): E78.00 - 

Pure hypercholesterolemia, unspecified; E78.0 - Pure hypercholesterolemia   





(9) UTI (urinary tract infection)


Code(s): N39.0 - URINARY TRACT INFECTION, SITE NOT SPECIFIED   


Qualifiers: 


   Urinary tract infection type: site unspecified   Hematuria presence: with 

hematuria   Qualified Code(s): N39.0 - Urinary tract infection, site not 

specified; R31.9 - Hematuria, unspecified   





(10) Weakness


Code(s): R53.1 - WEAKNESS   





Assessment/Plan








IMP  S/P FALL ?SYNCOPE


      R/O RLL INFILTRATE LIKELY ASPIRATION


      ALTERED MENTAL STATUS improving


      COPD  


      UTI


      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


      HTN


      ASHD


      + TROPONIN LIKELY DEMAND ISCHEMIA


      ELEVATED LFTS





PLAN  SUPPLEMENTAL O2


         INHALED BRONCHODILATORS


         ABX AS PER ID


        TREND LFTS,TROPONINS


        MONITOR LYTES


        ASPIRATION PRECAUTIONS





DR MONTEJO





 Problem List 





- Problems


(1) Acute metabolic encephalopathy


Code(s): G93.41 - METABOLIC ENCEPHALOPATHY   





(2) CAD (coronary artery disease)


Code(s): I25.10 - ATHSCL HEART DISEASE OF NATIVE CORONARY ARTERY W/O ANG PCTRS  




Qualifiers: 


   Coronary Disease-Associated Artery/Lesion type: native artery   Native vs. 

transplanted heart: native heart   Associated angina: without angina   Qualified

Code(s): I25.10 - Atherosclerotic heart disease of native coronary artery 

without angina pectoris   





(3) COPD (chronic obstructive pulmonary disease)


Code(s): J44.9 - CHRONIC OBSTRUCTIVE PULMONARY DISEASE, UNSPECIFIED   


Qualifiers: 


   COPD type: unspecified COPD   Qualified Code(s): J44.9 - Chronic obstructive 

pulmonary disease, unspecified   





(4) Demand ischemia


Code(s): I24.8 - OTHER FORMS OF ACUTE ISCHEMIC HEART DISEASE   





(5) Diastolic dysfunction


Code(s): I51.89 - OTHER ILL-DEFINED HEART DISEASES   





(6) Fall


Code(s): W19.XXXA - UNSPECIFIED FALL, INITIAL ENCOUNTER   





(7) HTN (hypertension)


Code(s): I10 - ESSENTIAL (PRIMARY) HYPERTENSION   


Qualifiers: 


   Hypertension type: essential hypertension   Qualified Code(s): I10 - 

Essential (primary) hypertension   





(8) Hyperlipidemia


Code(s): E78.5 - HYPERLIPIDEMIA, UNSPECIFIED   


Qualifiers: 


   Hyperlipidemia type: pure hypercholesterolemia   Qualified Code(s): E78.00 - 

Pure hypercholesterolemia, unspecified; E78.0 - Pure hypercholesterolemia   





(9) UTI (urinary tract infection)


Code(s): N39.0 - URINARY TRACT INFECTION, SITE NOT SPECIFIED   


Qualifiers: 


   Urinary tract infection type: site unspecified   Hematuria presence: with 

hematuria   Qualified Code(s): N39.0 - Urinary tract infection, site not 

specified; R31.9 - Hematuria, unspecified   





(10) Weakness


Code(s): R53.1 - WEAKNESS

## 2020-09-04 NOTE — PN
Progress Note, Physician


History of Present Illness: 





stable


no new issues


confused





- Current Medication List


Current Medications: 


Active Medications





Albuterol/Ipratropium (Duoneb -)  1 amp NEB Q6H PRN


   PRN Reason: SHORTNESS OF BREATH


Aspirin (Ecotrin -)  81 mg PO DAILY [facility]


   Last Admin: 09/04/20 10:30 Dose:  81 mg


   Documented by: 


Atorvastatin Calcium (Lipitor -)  40 mg PO HS [facility]


   Last Admin: 09/03/20 21:47 Dose:  40 mg


   Documented by: 


Budesonide/Formoterol Fumarate (Symbicort 80/4.5mcg -)  2 puff IH BID [facility]


   Last Admin: 09/04/20 10:44 Dose:  2 puff


   Documented by: 


Calcium Carbonate (Os-Noah 500mg -)  500 mg PO BID [facility]


   Last Admin: 09/04/20 10:30 Dose:  500 mg


   Documented by: 


Docusate Sodium (Colace -)  100 mg PO TID [facility]


   Last Admin: 09/04/20 05:53 Dose:  Not Given


   Documented by: 


Enoxaparin Sodium (Lovenox -)  40 mg SQ DAILY [facility]


   Last Admin: 09/04/20 10:29 Dose:  40 mg


   Documented by: 


Ceftriaxone Sodium 1 gm/ (Dextrose)  50 mls @ 100 mls/hr IVPB Q24H [facility]; Protocol


   Last Admin: 09/03/20 21:47 Dose:  100 mls/hr


   Documented by: 


Lisinopril (Prinivil)  5 mg PO DAILY [facility]


   Last Admin: 09/04/20 10:35 Dose:  5 mg


   Documented by: 


Metoprolol Succinate (Toprol Xl -)  25 mg PO DAILY [facility]


   Last Admin: 09/04/20 10:30 Dose:  25 mg


   Documented by: 


Multivitamins/Minerals/Vitamin C (Tab-A-Vit -)  1 tab PO DAILY [facility]


   Last Admin: 09/04/20 10:29 Dose:  1 tab


   Documented by: 


Polyethylene Glycol (Miralax (For Daily Use) -)  17 gm PO DAILY [facility]


   Last Admin: 09/04/20 10:49 Dose:  Not Given


   Documented by: 











- Objective


Vital Signs: 


                                   Vital Signs











Temperature  98.8 F   09/04/20 06:20


 


Pulse Rate  61   09/04/20 06:20


 


Respiratory Rate  20   09/04/20 06:20


 


Blood Pressure  137/55 L  09/04/20 06:20


 


O2 Sat by Pulse Oximetry (%)  95   09/04/20 06:20











Constitutional: Yes: No Distress, Calm


Cardiovascular: Yes: S1, S2


Respiratory: Yes: Regular, CTA Bilaterally


Gastrointestinal: Yes: Normal Bowel Sounds, Soft


Musculoskeletal: Yes: WNL


Extremities: Yes: WNL


Neurological: Yes: Alert, Confusion


Labs: 


                                    CBC, BMP





                                 09/04/20 07:49 





                                 09/04/20 07:49 





                                    INR, PTT











INR  1.28  (0.83-1.09)  H  08/29/20  05:40    














Assessment/Plan





 Problem List 





- Problems


(1) Acute metabolic encephalopathy


Code(s): G93.41 - METABOLIC ENCEPHALOPATHY   





(2) CAD (coronary artery disease)


Code(s): I25.10 - ATHSCL HEART DISEASE OF NATIVE CORONARY ARTERY W/O ANG PCTRS  




Qualifiers: 


   Coronary Disease-Associated Artery/Lesion type: native artery   Native vs. 

transplanted heart: native heart   Associated angina: without angina   Qualified

Code(s): I25.10 - Atherosclerotic heart disease of native coronary artery 

without angina pectoris   





(3) COPD (chronic obstructive pulmonary disease)


Code(s): J44.9 - CHRONIC OBSTRUCTIVE PULMONARY DISEASE, UNSPECIFIED   


Qualifiers: 


   COPD type: unspecified COPD   Qualified Code(s): J44.9 - Chronic obstructive 

pulmonary disease, unspecified   





(4) Demand ischemia


Code(s): I24.8 - OTHER FORMS OF ACUTE ISCHEMIC HEART DISEASE   





(5) Diastolic dysfunction


Code(s): I51.89 - OTHER ILL-DEFINED HEART DISEASES   





(6) Fall


Code(s): W19.XXXA - UNSPECIFIED FALL, INITIAL ENCOUNTER   





(7) HTN (hypertension)


Code(s): I10 - ESSENTIAL (PRIMARY) HYPERTENSION   


Qualifiers: 


   Hypertension type: essential hypertension   Qualified Code(s): I10 - 

Essential (primary) hypertension   





(8) Hyperlipidemia


Code(s): E78.5 - HYPERLIPIDEMIA, UNSPECIFIED   


Qualifiers: 


   Hyperlipidemia type: pure hypercholesterolemia   Qualified Code(s): E78.00 - 

Pure hypercholesterolemia, unspecified; E78.0 - Pure hypercholesterolemia   





(9) UTI (urinary tract infection)


Code(s): N39.0 - URINARY TRACT INFECTION, SITE NOT SPECIFIED   


Qualifiers: 


   Urinary tract infection type: site unspecified   Hematuria presence: with 

hematuria   Qualified Code(s): N39.0 - Urinary tract infection, site not 

specified; R31.9 - Hematuria, unspecified   





(10) Weakness


Code(s): R53.1 - WEAKNESS   








plan


continue abx


monitor


once stbale


will deescalte

## 2020-09-05 LAB
ALBUMIN SERPL-MCNC: 2.3 G/DL (ref 3.4–5)
ALP SERPL-CCNC: 114 U/L (ref 45–117)
ALT SERPL-CCNC: 84 U/L (ref 13–61)
ANION GAP SERPL CALC-SCNC: 8 MMOL/L (ref 8–16)
AST SERPL-CCNC: 94 U/L (ref 15–37)
BASOPHILS # BLD: 0.9 % (ref 0–2)
BILIRUB SERPL-MCNC: 0.8 MG/DL (ref 0.2–1)
BUN SERPL-MCNC: 16.4 MG/DL (ref 7–18)
CALCIUM SERPL-MCNC: 8 MG/DL (ref 8.5–10.1)
CHLORIDE SERPL-SCNC: 113 MMOL/L (ref 98–107)
CO2 SERPL-SCNC: 26 MMOL/L (ref 21–32)
CREAT SERPL-MCNC: 0.7 MG/DL (ref 0.55–1.3)
DEPRECATED RDW RBC AUTO: 14.1 % (ref 11.9–15.9)
EOSINOPHIL # BLD: 2 % (ref 0–4.5)
GLUCOSE SERPL-MCNC: 96 MG/DL (ref 74–106)
HCT VFR BLD CALC: 37.4 % (ref 35.4–49)
HGB BLD-MCNC: 12.6 GM/DL (ref 11.7–16.9)
LYMPHOCYTES # BLD: 15.8 % (ref 8–40)
MAGNESIUM SERPL-MCNC: 1.9 MG/DL (ref 1.8–2.4)
MCH RBC QN AUTO: 32.7 PG (ref 25.7–33.7)
MCHC RBC AUTO-ENTMCNC: 33.8 G/DL (ref 32–35.9)
MCV RBC: 96.9 FL (ref 80–96)
MONOCYTES # BLD AUTO: 8.7 % (ref 3.8–10.2)
NEUTROPHILS # BLD: 72.6 % (ref 42.8–82.8)
PLATELET # BLD AUTO: 235 K/MM3 (ref 134–434)
PMV BLD: 8.9 FL (ref 7.5–11.1)
POTASSIUM SERPLBLD-SCNC: 3.6 MMOL/L (ref 3.5–5.1)
PROT SERPL-MCNC: 5.5 G/DL (ref 6.4–8.2)
RBC # BLD AUTO: 3.86 M/MM3 (ref 4–5.6)
SODIUM SERPL-SCNC: 146 MMOL/L (ref 136–145)
WBC # BLD AUTO: 9.4 K/MM3 (ref 4–10)

## 2020-09-05 RX ADMIN — LISINOPRIL SCH MG: 5 TABLET ORAL at 09:26

## 2020-09-05 RX ADMIN — ENOXAPARIN SODIUM SCH MG: 40 INJECTION SUBCUTANEOUS at 09:24

## 2020-09-05 RX ADMIN — ASPIRIN SCH MG: 81 TABLET, COATED ORAL at 09:25

## 2020-09-05 RX ADMIN — DOCUSATE SODIUM SCH: 100 CAPSULE, LIQUID FILLED ORAL at 22:10

## 2020-09-05 RX ADMIN — POLYETHYLENE GLYCOL 3350 SCH: 17 POWDER, FOR SOLUTION ORAL at 09:26

## 2020-09-05 RX ADMIN — BUDESONIDE AND FORMOTEROL FUMARATE DIHYDRATE SCH PUFF: 80; 4.5 AEROSOL RESPIRATORY (INHALATION) at 22:11

## 2020-09-05 RX ADMIN — BUDESONIDE AND FORMOTEROL FUMARATE DIHYDRATE SCH PUFF: 80; 4.5 AEROSOL RESPIRATORY (INHALATION) at 14:33

## 2020-09-05 RX ADMIN — CALCIUM SCH MG: 500 TABLET ORAL at 09:25

## 2020-09-05 RX ADMIN — DOCUSATE SODIUM SCH: 100 CAPSULE, LIQUID FILLED ORAL at 05:48

## 2020-09-05 RX ADMIN — MULTIVITAMIN TABLET SCH TAB: TABLET at 09:25

## 2020-09-05 RX ADMIN — CALCIUM SCH MG: 500 TABLET ORAL at 22:11

## 2020-09-05 RX ADMIN — DOCUSATE SODIUM SCH: 100 CAPSULE, LIQUID FILLED ORAL at 14:35

## 2020-09-05 RX ADMIN — CEFTRIAXONE SCH MLS/HR: 1 INJECTION, POWDER, FOR SOLUTION INTRAMUSCULAR; INTRAVENOUS at 22:11

## 2020-09-05 NOTE — CON.NEURO
Consult





- Alcohol/Substance Use


Hx Alcohol Use: No





- Smoking History


Smoking history: Former smoker


Have you smoked in the past 12 months: No


Aproximately how many cigarettes per day: 20





- Social History


Occupation: [employer]





Home Medications





- Allergies


Allergies/Adverse Reactions: 


                                    Allergies











Allergy/AdvReac Type Severity Reaction Status Date / Time


 


No Known Allergies Allergy   Verified 11/08/19 15:36














Physical Exam-Neuro


Vital Signs: 


                                   Vital Signs











Temperature  98.1 F   09/05/20 05:48


 


Pulse Rate  68   09/05/20 10:00


 


Respiratory Rate  18   09/05/20 10:00


 


Blood Pressure  150/68   09/05/20 10:00


 


O2 Sat by Pulse Oximetry (%)  96   09/05/20 10:00











Labs: 


                                    CBC, BMP





                                 09/05/20 11:16 





                                 09/05/20 11:16 





                                    INR, PTT











INR  1.28  (0.83-1.09)  H  08/29/20  05:40    














Assessment/Plan


cc Possible Parkinson disease


HPI 79 year old male hisotyr of   COPD, HTN, CAD, lung resection 10 yrs ago s/p 

TB lung nodule who was BIB his son after a fall amd ? syncope. 


Patient has also being treated for confusion and possible deliriums. THere is 

family hisotry of Parkinson disease. I was asked toe valuate if he has parkinson

disease or lewy body disease. Patient did get confused during hospitalization . 

He has no history  parkinson disease.





Social History: lives alone in independent living housing


Smoking: denies


Alcohol:denies


Drugs: denies





Allergies





No Known Allergies Allergy (Verified 11/08/19 15:36)


   





Medication , SH,FH, Ros reviewed in chart


NEUROLOGICAL EXAMINATION


Alert oriented x 1, neck is supple vss


eomi, pupils reactive no face asymmetry


moving all ext


no rigidity or tremors identified








ct head showed small meningioma








Assessment/Plan 79 year old male hisotyr of   COPD, HTN, CAD, lung resection 10 

yrs ago s/p TB lung nodule  came for syncope and has pneumonia and uti. Patient 

has been delirius , unlikely to be PD at this time, no evidence of resting 

tremor or rigidity.





Plan: watch it for now, would not benefit from sinemet at this time.


- continue current level of care





Thanking you so much


Bello Mcadams MD

## 2020-09-05 NOTE — PN
Progress Note, Physician


History of Present Illness: 





still with confusion


stable





- Current Medication List


Current Medications: 


Active Medications





Albuterol/Ipratropium (Duoneb -)  1 amp NEB Q6H PRN


   PRN Reason: SHORTNESS OF BREATH


Aspirin (Ecotrin -)  81 mg PO DAILY [facility]


   Last Admin: 09/05/20 09:25 Dose:  81 mg


   Documented by: 


Atorvastatin Calcium (Lipitor -)  40 mg PO HS [facility]


   Last Admin: 09/03/20 21:47 Dose:  40 mg


   Documented by: 


Budesonide/Formoterol Fumarate (Symbicort 80/4.5mcg -)  2 puff IH BID [facility]


   Last Admin: 09/04/20 22:52 Dose:  2 puff


   Documented by: 


Calcium Carbonate (Os-Noah 500mg -)  500 mg PO BID [facility]


   Last Admin: 09/05/20 09:25 Dose:  500 mg


   Documented by: 


Docusate Sodium (Colace -)  100 mg PO TID [facility]


   Last Admin: 09/05/20 05:48 Dose:  Not Given


   Documented by: 


Enoxaparin Sodium (Lovenox -)  40 mg SQ DAILY [facility]


   Last Admin: 09/05/20 09:24 Dose:  40 mg


   Documented by: 


Ceftriaxone Sodium 1 gm/ (Dextrose)  50 mls @ 100 mls/hr IVPB Q24H [facility]; Protocol


   Last Admin: 09/04/20 22:52 Dose:  100 mls/hr


   Documented by: 


Lisinopril (Prinivil)  5 mg PO DAILY [facility]


   Last Admin: 09/05/20 09:26 Dose:  5 mg


   Documented by: 


Metoprolol Succinate (Toprol Xl -)  25 mg PO DAILY [facility]


   Last Admin: 09/05/20 09:25 Dose:  25 mg


   Documented by: 


Multivitamins/Minerals/Vitamin C (Tab-A-Vit -)  1 tab PO DAILY [facility]


   Last Admin: 09/05/20 09:25 Dose:  1 tab


   Documented by: 


Polyethylene Glycol (Miralax (For Daily Use) -)  17 gm PO DAILY [facility]


   Last Admin: 09/05/20 09:26 Dose:  Not Given


   Documented by: 











- Objective


Vital Signs: 


                                   Vital Signs











Temperature  98.1 F   09/05/20 05:48


 


Pulse Rate  72   09/05/20 05:48


 


Respiratory Rate  18   09/05/20 05:48


 


Blood Pressure  145/74   09/05/20 05:48


 


O2 Sat by Pulse Oximetry (%)  92 L  09/05/20 05:48











Constitutional: Yes: No Distress, Calm


Cardiovascular: Yes: S1, S2


Respiratory: Yes: Regular, CTA Bilaterally


Gastrointestinal: Yes: Normal Bowel Sounds, Soft


Musculoskeletal: Yes: WNL


Extremities: Yes: WNL


Neurological: Yes: Alert, Confusion


Labs: 


                                    CBC, BMP





                                 09/04/20 07:49 





                                 09/04/20 07:49 





                                    INR, PTT











INR  1.28  (0.83-1.09)  H  08/29/20  05:40    














Assessment/Plan





 Problem List 





- Problems


(1) Acute metabolic encephalopathy


Code(s): G93.41 - METABOLIC ENCEPHALOPATHY   





(2) CAD (coronary artery disease)


Code(s): I25.10 - ATHSCL HEART DISEASE OF NATIVE CORONARY ARTERY W/O ANG PCTRS  




Qualifiers: 


   Coronary Disease-Associated Artery/Lesion type: native artery   Native vs. 

transplanted heart: native heart   Associated angina: without angina   Qualified

Code(s): I25.10 - Atherosclerotic heart disease of native coronary artery 

without angina pectoris   





(3) COPD (chronic obstructive pulmonary disease)


Code(s): J44.9 - CHRONIC OBSTRUCTIVE PULMONARY DISEASE, UNSPECIFIED   


Qualifiers: 


   COPD type: unspecified COPD   Qualified Code(s): J44.9 - Chronic obstructive 

pulmonary disease, unspecified   





(4) Demand ischemia


Code(s): I24.8 - OTHER FORMS OF ACUTE ISCHEMIC HEART DISEASE   





(5) Diastolic dysfunction


Code(s): I51.89 - OTHER ILL-DEFINED HEART DISEASES   





(6) Fall


Code(s): W19.XXXA - UNSPECIFIED FALL, INITIAL ENCOUNTER   





(7) HTN (hypertension)


Code(s): I10 - ESSENTIAL (PRIMARY) HYPERTENSION   


Qualifiers: 


   Hypertension type: essential hypertension   Qualified Code(s): I10 - 

Essential (primary) hypertension   





(8) Hyperlipidemia


Code(s): E78.5 - HYPERLIPIDEMIA, UNSPECIFIED   


Qualifiers: 


   Hyperlipidemia type: pure hypercholesterolemia   Qualified Code(s): E78.00 - 

Pure hypercholesterolemia, unspecified; E78.0 - Pure hypercholesterolemia   





(9) UTI (urinary tract infection)


Code(s): N39.0 - URINARY TRACT INFECTION, SITE NOT SPECIFIED   


Qualifiers: 


   Urinary tract infection type: site unspecified   Hematuria presence: with 

hematuria   Qualified Code(s): N39.0 - Urinary tract infection, site not 

specified; R31.9 - Hematuria, unspecified   





(10) Weakness


Code(s): R53.1 - WEAKNESS   








plan


continue abx


monitor


once stbale


will deescalte tomorrow

## 2020-09-05 NOTE — PN
Physical Exam: 


SUBJECTIVE: Patient seen and examined, alert, oriented x2, tolerating nectar 

thick fluids 


afebrile, labs pending for today








OBJECTIVE:





                                   Vital Signs











 Period  Temp  Pulse  Resp  BP Sys/Perez  Pulse Ox


 


 Last 24 Hr  97.8 F-98.7 F  57-74  18-18  124-146/59-74  92-98











GENERAL: The patient is awake, alert, confused, in no acute distress.


HEAD: Normal with no signs of trauma.


EYES: PERRL, extraocular movements intact, sclera anicteric, conjunctiva clear. 

No ptosis. 


ENT: Ears normal, nares patent, oropharynx clear without exudates, moist mucous 


membranes.


NECK: Trachea midline, full range of motion, supple. 


LUNGS: Breath sounds equal, clear to auscultation bilaterally, no wheezes, no 

crackles, no 


accessory muscle use. 


HEART: Regular rate and rhythm, S1, S2 without murmur, rub or gallop.


ABDOMEN: Soft, nontender, nondistended, normoactive bowel sounds, no guarding, 

no 


rebound, no hepatosplenomegaly, no masses.


EXTREMITIES: 2+ pulses, warm, well-perfused, no edema. 


NEUROLOGICAL: Cranial nerves II through XII grossly intact. Normal speech, gait 

not 


observed.


PSYCH: Normal mood, normal affect.


SKIN: Warm, dry, normal turgor, no rashes or lesions noted














Active Medications











Generic Name Dose Route Start Last Admin





  Trade Name Freq  PRN Reason Stop Dose Admin


 


Albuterol/Ipratropium  1 amp  09/03/20 20:10 





  Duoneb -  NEB  





  Q6H PRN  





  SHORTNESS OF BREATH  


 


Aspirin  81 mg  09/04/20 10:00  09/05/20 09:25





  Ecotrin -  PO   81 mg





  DAILY MARISSA   Administration


 


Atorvastatin Calcium  40 mg  09/03/20 22:00  09/03/20 21:47





  Lipitor -  PO   40 mg





  HS MARISSA   Administration


 


Budesonide/Formoterol Fumarate  2 puff  09/03/20 22:00  09/04/20 22:52





  Symbicort 80/4.5mcg -  IH   2 puff





  BID MARISSA   Administration


 


Calcium Carbonate  500 mg  09/03/20 22:00  09/05/20 09:25





  Os-Noah 500mg -  PO   500 mg





  BID MARISSA   Administration


 


Docusate Sodium  100 mg  09/03/20 22:00  09/05/20 05:48





  Colace -  PO   Not Given





  TID MARISSA  


 


Enoxaparin Sodium  40 mg  09/04/20 10:00  09/05/20 09:24





  Lovenox -  SQ   40 mg





  DAILY MARISSA   Administration


 


Ceftriaxone Sodium 1 gm/  50 mls @ 100 mls/hr  09/03/20 22:00  09/04/20 22:52





  Dextrose  IVPB   100 mls/hr





  Q24H MARISSA   Administration





  Protocol  


 


Lisinopril  5 mg  09/04/20 10:00  09/05/20 09:26





  Prinivil  PO   5 mg





  DAILY MARISSA   Administration


 


Metoprolol Succinate  25 mg  09/04/20 10:00  09/05/20 09:25





  Toprol Xl -  PO   25 mg





  DAILY MARISSA   Administration


 


Multivitamins/Minerals/Vitamin C  1 tab  09/04/20 10:00  09/05/20 09:25





  Tab-A-Vit -  PO   1 tab





  DAILY MARISSA   Administration


 


Polyethylene Glycol  17 gm  09/04/20 10:00  09/05/20 09:26





  Miralax (For Daily Use) -  PO   Not Given





  DAILY MARISSA  











ASSESSMENT/PLAN:


Patient is an 79 year old male with a significant past medical history of COPD, 

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

a fall.  on admission patient also noted to have toxic metabolic encephalopathy 

with moderate protein calorie malnutrition.   He was found to have Klebsiella 

UTI and on ceftriaxone. 








 Problem List





- Problems


(1) UTI due to Klebsiella species


Assessment/Plan: 


on ceftriaxone day 7. continue Iv per ID


Code(s): N39.0 - URINARY TRACT INFECTION, SITE NOT SPECIFIED; B96.89 - OTH 

BACTERIAL AGENTS AS THE CAUSE OF DISEASES CLASSD ELSWHR   





(2) Acute metabolic encephalopathy


Assessment/Plan: 


head ct negative, blood cultures negative to date


UC + Kleb, on Ceftriaxone 1 gram daily


ID following


swallow eval showed need for aspiration precautions with nectar thick fluids


monitor mental status


Code(s): G93.41 - METABOLIC ENCEPHALOPATHY   





(3) CAD (coronary artery disease)


Assessment/Plan: 


on lipitor


Code(s): I25.10 - ATHSCL HEART DISEASE OF NATIVE CORONARY ARTERY W/O ANG PCTRS  




Qualifiers: 


   Coronary Disease-Associated Artery/Lesion type: native artery   Native vs. 

transplanted heart: native heart   Associated angina: without angina   Qualified

Code(s): I25.10 - Atherosclerotic heart disease of native coronary artery 

without angina pectoris   





(4) COPD (chronic obstructive pulmonary disease)


Assessment/Plan: 


mild wheezing of anterior upper lobes, diminished otherwise.  on 2 liters of 

nasal cannula


pulmonary consulted and following 


chest ct shows moderate to severe COPD. on symbicort.


Code(s): J44.9 - CHRONIC OBSTRUCTIVE PULMONARY DISEASE, UNSPECIFIED   


Qualifiers: 


   COPD type: unspecified COPD   Qualified Code(s): J44.9 - Chronic obstructive 

pulmonary disease, unspecified   





(5) Constipation


Assessment/Plan: 


bowel regimen ordered


Code(s): K59.00 - CONSTIPATION, UNSPECIFIED   





(6) Demand ischemia


Assessment/Plan: 


cardiology following


Code(s): I24.8 - OTHER FORMS OF ACUTE ISCHEMIC HEART DISEASE   





(7) Diastolic dysfunction


Assessment/Plan: 


no signs of volume overload.  cardiology following


lisinopril added


Code(s): I51.89 - OTHER ILL-DEFINED HEART DISEASES   





(8) Moderate protein-calorie malnutrition


Assessment/Plan: 


on supplemnts, RD following


Code(s): E44.0 - MODERATE PROTEIN-CALORIE MALNUTRITION   





(9) Prophylactic measure


Code(s): Z29.9 - ENCOUNTER FOR PROPHYLACTIC MEASURES, UNSPECIFIED   





(10) Rhabdomyolysis


Assessment/Plan: 


CPK improving, daily labs.


Code(s): M62.82 - RHABDOMYOLYSIS   





(11) Fall


Assessment/Plan: 


physical therapy evaluation. patient for SNF placement


Code(s): W19.XXXA - UNSPECIFIED FALL, INITIAL ENCOUNTER   





(12) Elevated liver enzymes


Assessment/Plan: 


per ultrasound, mild fatty infiltration of the liver noted.


abdominal aortic anyeursm wit stent and possible gallbladder polyp


liver enzymes trending down


Code(s): R74.8 - ABNORMAL LEVELS OF OTHER SERUM ENZYMES   





(13) DVT prophylaxis


Assessment/Plan: 


on lovenox


Code(s): Z29.9 - ENCOUNTER FOR PROPHYLACTIC MEASURES, UNSPECIFIED   








Visit type





- Emergency Visit


Emergency Visit: Yes


ED Registration Date: 08/28/20


Care time: The patient presented to the Emergency Department on the above date a

nd was hospitalized for further evaluation of their emergent condition.





- New Patient


This patient is new to me today: Yes


Date on this admission: 09/05/20





- Critical Care


Critical Care patient: No





- Medication Review


Med list reviewed for High Risk Meds patients 65 and older: No

## 2020-09-05 NOTE — PN
Progress Note, Physician


History of Present Illness: 





pulmonary





awake,comfortable,-resp distress,on nasal o2





- Current Medication List


Current Medications: 


Active Medications





Albuterol/Ipratropium (Duoneb -)  1 amp NEB Q6H PRN


   PRN Reason: SHORTNESS OF BREATH


Aspirin (Ecotrin -)  81 mg PO DAILY [facility]


   Last Admin: 09/04/20 10:30 Dose:  81 mg


   Documented by: 


Atorvastatin Calcium (Lipitor -)  40 mg PO HS [facility]


   Last Admin: 09/03/20 21:47 Dose:  40 mg


   Documented by: 


Budesonide/Formoterol Fumarate (Symbicort 80/4.5mcg -)  2 puff IH BID [facility]


   Last Admin: 09/04/20 22:52 Dose:  2 puff


   Documented by: 


Calcium Carbonate (Os-Noah 500mg -)  500 mg PO BID [facility]


   Last Admin: 09/04/20 22:51 Dose:  500 mg


   Documented by: 


Docusate Sodium (Colace -)  100 mg PO TID [facility]


   Last Admin: 09/05/20 05:48 Dose:  Not Given


   Documented by: 


Enoxaparin Sodium (Lovenox -)  40 mg SQ DAILY [facility]


   Last Admin: 09/04/20 10:29 Dose:  40 mg


   Documented by: 


Ceftriaxone Sodium 1 gm/ (Dextrose)  50 mls @ 100 mls/hr IVPB Q24H [facility]; Protocol


   Last Admin: 09/04/20 22:52 Dose:  100 mls/hr


   Documented by: 


Lisinopril (Prinivil)  5 mg PO DAILY [facility]


   Last Admin: 09/04/20 10:35 Dose:  5 mg


   Documented by: 


Metoprolol Succinate (Toprol Xl -)  25 mg PO DAILY [facility]


   Last Admin: 09/04/20 10:30 Dose:  25 mg


   Documented by: 


Multivitamins/Minerals/Vitamin C (Tab-A-Vit -)  1 tab PO DAILY [facility]


   Last Admin: 09/04/20 10:29 Dose:  1 tab


   Documented by: 


Polyethylene Glycol (Miralax (For Daily Use) -)  17 gm PO DAILY [facility]


   Last Admin: 09/04/20 10:49 Dose:  Not Given


   Documented by: 











- Objective


Vital Signs: 


                                   Vital Signs











Temperature  98.1 F   09/05/20 05:48


 


Pulse Rate  72   09/05/20 05:48


 


Respiratory Rate  18   09/05/20 05:48


 


Blood Pressure  145/74   09/05/20 05:48


 


O2 Sat by Pulse Oximetry (%)  92 L  09/05/20 05:48











Constitutional: Yes: Calm, Thin


Eyes: Yes: WNL


HENT: Yes: WNL


Neck: Yes: WNL


Cardiovascular: Yes: Regular Rate and Rhythm, S1, S2


Respiratory: Yes: Rales (scattered pablito crackles)


Gastrointestinal: Yes: Normal Bowel Sounds, Soft


Extremities: Yes: WNL


Edema: No


Labs: 


                                    CBC, BMP





                                 09/04/20 07:49 





                                 09/04/20 07:49 





                                    INR, PTT











INR  1.28  (0.83-1.09)  H  08/29/20  05:40    














Problem List





- Problems


(1) Acute metabolic encephalopathy


Code(s): G93.41 - METABOLIC ENCEPHALOPATHY   





(2) CAD (coronary artery disease)


Code(s): I25.10 - ATHSCL HEART DISEASE OF NATIVE CORONARY ARTERY W/O ANG PCTRS  




Qualifiers: 


   Coronary Disease-Associated Artery/Lesion type: native artery   Native vs. 

transplanted heart: native heart   Associated angina: without angina   Qualified

Code(s): I25.10 - Atherosclerotic heart disease of native coronary artery 

without angina pectoris   





(3) COPD (chronic obstructive pulmonary disease)


Code(s): J44.9 - CHRONIC OBSTRUCTIVE PULMONARY DISEASE, UNSPECIFIED   


Qualifiers: 


   COPD type: unspecified COPD   Qualified Code(s): J44.9 - Chronic obstructive 

pulmonary disease, unspecified   





(4) Demand ischemia


Code(s): I24.8 - OTHER FORMS OF ACUTE ISCHEMIC HEART DISEASE   





(5) Diastolic dysfunction


Code(s): I51.89 - OTHER ILL-DEFINED HEART DISEASES   





(6) Fall


Code(s): W19.XXXA - UNSPECIFIED FALL, INITIAL ENCOUNTER   





(7) HTN (hypertension)


Code(s): I10 - ESSENTIAL (PRIMARY) HYPERTENSION   


Qualifiers: 


   Hypertension type: essential hypertension   Qualified Code(s): I10 - 

Essential (primary) hypertension   





(8) Hyperlipidemia


Code(s): E78.5 - HYPERLIPIDEMIA, UNSPECIFIED   


Qualifiers: 


   Hyperlipidemia type: pure hypercholesterolemia   Qualified Code(s): E78.00 - 

Pure hypercholesterolemia, unspecified; E78.0 - Pure hypercholesterolemia   





(9) UTI (urinary tract infection)


Code(s): N39.0 - URINARY TRACT INFECTION, SITE NOT SPECIFIED   


Qualifiers: 


   Urinary tract infection type: site unspecified   Hematuria presence: with 

hematuria   Qualified Code(s): N39.0 - Urinary tract infection, site not 

specified; R31.9 - Hematuria, unspecified   





(10) Weakness


Code(s): R53.1 - WEAKNESS   





Assessment/Plan








IMP  S/P FALL ?SYNCOPE


      R/O RLL INFILTRATE LIKELY ASPIRATION


      ALTERED MENTAL STATUS improving


      COPD  


      UTI


      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


      HTN


      ASHD


      + TROPONIN LIKELY DEMAND ISCHEMIA


      ELEVATED LFTS





PLAN  SUPPLEMENTAL O2


         INHALED BRONCHODILATORS


         ABX AS PER ID


        MONITOR LYTES


        ASPIRATION PRECAUTIONS


         CHEST X-RAY AM





DR MONTEJO





 Problem List 





- Problems


(1) Acute metabolic encephalopathy


Code(s): G93.41 - METABOLIC ENCEPHALOPATHY   





(2) CAD (coronary artery disease)


Code(s): I25.10 - ATHSCL HEART DISEASE OF NATIVE CORONARY ARTERY W/O ANG PCTRS  




Qualifiers: 


   Coronary Disease-Associated Artery/Lesion type: native artery   Native vs. 

transplanted heart: native heart   Associated angina: without angina   Qualified

Code(s): I25.10 - Atherosclerotic heart disease of native coronary artery 

without angina pectoris   





(3) COPD (chronic obstructive pulmonary disease)


Code(s): J44.9 - CHRONIC OBSTRUCTIVE PULMONARY DISEASE, UNSPECIFIED   


Qualifiers: 


   COPD type: unspecified COPD   Qualified Code(s): J44.9 - Chronic obstructive 

pulmonary disease, unspecified   





(4) Demand ischemia


Code(s): I24.8 - OTHER FORMS OF ACUTE ISCHEMIC HEART DISEASE   





(5) Diastolic dysfunction


Code(s): I51.89 - OTHER ILL-DEFINED HEART DISEASES   





(6) Fall


Code(s): W19.XXXA - UNSPECIFIED FALL, INITIAL ENCOUNTER   





(7) HTN (hypertension)


Code(s): I10 - ESSENTIAL (PRIMARY) HYPERTENSION   


Qualifiers: 


   Hypertension type: essential hypertension   Qualified Code(s): I10 - 

Essential (primary) hypertension   





(8) Hyperlipidemia


Code(s): E78.5 - HYPERLIPIDEMIA, UNSPECIFIED   


Qualifiers: 


   Hyperlipidemia type: pure hypercholesterolemia   Qualified Code(s): E78.00 - 

Pure hypercholesterolemia, unspecified; E78.0 - Pure hypercholesterolemia   





(9) UTI (urinary tract infection)


Code(s): N39.0 - URINARY TRACT INFECTION, SITE NOT SPECIFIED   


Qualifiers: 


   Urinary tract infection type: site unspecified   Hematuria presence: with 

hematuria   Qualified Code(s): N39.0 - Urinary tract infection, site not 

specified; R31.9 - Hematuria, unspecified   





(10) Weakness


Code(s): R53.1 - WEAKNESS

## 2020-09-05 NOTE — PN
Progress Note, Physician





- Current Medication List


Current Medications: 


Active Medications





Albuterol/Ipratropium (Duoneb -)  1 amp NEB Q6H PRN


   PRN Reason: SHORTNESS OF BREATH


Aspirin (Ecotrin -)  81 mg PO DAILY [facility]


   Last Admin: 09/05/20 09:25 Dose:  81 mg


   Documented by: 


Atorvastatin Calcium (Lipitor -)  40 mg PO HS [facility]


   Last Admin: 09/03/20 21:47 Dose:  40 mg


   Documented by: 


Budesonide/Formoterol Fumarate (Symbicort 80/4.5mcg -)  2 puff IH BID [facility]


   Last Admin: 09/04/20 22:52 Dose:  2 puff


   Documented by: 


Calcium Carbonate (Os-Noah 500mg -)  500 mg PO BID [facility]


   Last Admin: 09/05/20 09:25 Dose:  500 mg


   Documented by: 


Docusate Sodium (Colace -)  100 mg PO TID [facility]


   Last Admin: 09/05/20 05:48 Dose:  Not Given


   Documented by: 


Enoxaparin Sodium (Lovenox -)  40 mg SQ DAILY [facility]


   Last Admin: 09/05/20 09:24 Dose:  40 mg


   Documented by: 


Ceftriaxone Sodium 1 gm/ (Dextrose)  50 mls @ 100 mls/hr IVPB Q24H [facility]; Protocol


   Last Admin: 09/04/20 22:52 Dose:  100 mls/hr


   Documented by: 


Lisinopril (Prinivil)  5 mg PO DAILY [facility]


   Last Admin: 09/05/20 09:26 Dose:  5 mg


   Documented by: 


Metoprolol Succinate (Toprol Xl -)  25 mg PO DAILY [facility]


   Last Admin: 09/05/20 09:25 Dose:  25 mg


   Documented by: 


Multivitamins/Minerals/Vitamin C (Tab-A-Vit -)  1 tab PO DAILY [facility]


   Last Admin: 09/05/20 09:25 Dose:  1 tab


   Documented by: 


Polyethylene Glycol (Miralax (For Daily Use) -)  17 gm PO DAILY [facility]


   Last Admin: 09/05/20 09:26 Dose:  Not Given


   Documented by: 











- Objective


Vital Signs: 


                                   Vital Signs











Temperature  98.1 F   09/05/20 05:48


 


Pulse Rate  72   09/05/20 05:48


 


Respiratory Rate  18   09/05/20 05:48


 


Blood Pressure  145/74   09/05/20 05:48


 


O2 Sat by Pulse Oximetry (%)  92 L  09/05/20 05:48











Eyes: Yes: WNL, Conjunctiva Clear, EOM Intact


HENT: Yes: WNL, Atraumatic, Normocephalic


Neck: Yes: WNL, Supple, Trachea Midline


Cardiovascular: Yes: WNL, Regular Rate and Rhythm


Respiratory: Yes: Diminished


Gastrointestinal: Yes: WNL, Normal Bowel Sounds


Genitourinary: Yes: WNL


Musculoskeletal: Yes: WNL


Extremities: Yes: WNL


Edema: No


Integumentary: Yes: WNL


Labs: 


                                    CBC, BMP





                                 09/04/20 07:49 





                                 09/04/20 07:49 





                                    INR, PTT











INR  1.28  (0.83-1.09)  H  08/29/20  05:40    














Assessment/Plan








- Problems


(1) CAD (coronary artery disease)


Code(s): I25.10 - ATHSCL HEART DISEASE OF NATIVE CORONARY ARTERY W/O ANG PCTRS  




Qualifiers: 


   Coronary Disease-Associated Artery/Lesion type: native artery   Native vs. 

transplanted heart: native heart   Associated angina: without angina   Qualified

Code(s): I25.10 - Atherosclerotic heart disease of native coronary artery 

without angina pectoris   





(2) COPD (chronic obstructive pulmonary disease)


Code(s): J44.9 - CHRONIC OBSTRUCTIVE PULMONARY DISEASE, UNSPECIFIED   


Qualifiers: 


   COPD type: unspecified COPD   Qualified Code(s): J44.9 - Chronic obstructive 

pulmonary disease, unspecified   





(3) HTN (hypertension)


Code(s): I10 - ESSENTIAL (PRIMARY) HYPERTENSION   


Qualifiers: 


   Hypertension type: essential hypertension   Qualified Code(s): I10 - 

Essential (primary) hypertension   





(4) Diastolic dysfunction


Code(s): I51.89 - OTHER ILL-DEFINED HEART DISEASES   





(5) Demand ischemia


Code(s): I24.8 - OTHER FORMS OF ACUTE ISCHEMIC HEART DISEASE   





(6) Hyperlipidemia


Code(s): E78.5 - HYPERLIPIDEMIA, UNSPECIFIED   


Qualifiers: 


   Hyperlipidemia type: pure hypercholesterolemia   Qualified Code(s): E78.00 - 

Pure hypercholesterolemia, unspecified; E78.0 - Pure hypercholesterolemia   





Assessment/Plan


08/31/2020 Echo: Normal biventricular size and fxn, mild MR, TR, abnl LV 

compliance





1.  Unwitnessed fall unclear mechanical versus a syncopal episode


2.  Coronary artery disease post percutaneous coronary intervention with 

evidence of demand ischemic injury angina pectoris (Patient has not had coronary

artery bypass graft surgery as stated in the notes)


3.  Diastolic left ventricular dysfunction with class 0 New York Heart 

Association classification left ventricular failure


4.  Hypertensive cardiovascular disease


5.  Hypercholesterolemia


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


7.  History of lung surgery partial resection


8.  History of chronic obstructive pulmonary disease


9.  Urinary tract infection


10.  Chronic kidney disease


11.  Abnormal liver function testing


12. Fever on empiric abx





PLAN:





1.  Continue Toprol-XL 25 qd and lisinopril 5 qd with dose titration as 

needed/as tolerated, troponins have plateaued


2.  Continue Lipitor 40 qd and Ecotrin 81 qd, repleted K


3.  Antibiotics as per the primary team


4.  PT->gait training, DVT prophylaxis, dysphagia diet

## 2020-09-06 VITALS — HEART RATE: 69 BPM | TEMPERATURE: 98.9 F | DIASTOLIC BLOOD PRESSURE: 57 MMHG | SYSTOLIC BLOOD PRESSURE: 115 MMHG

## 2020-09-06 LAB
ALBUMIN SERPL-MCNC: 2.4 G/DL (ref 3.4–5)
ALP SERPL-CCNC: 106 U/L (ref 45–117)
ALT SERPL-CCNC: 82 U/L (ref 13–61)
ANION GAP SERPL CALC-SCNC: 6 MMOL/L (ref 8–16)
AST SERPL-CCNC: 74 U/L (ref 15–37)
BASOPHILS # BLD: 0.8 % (ref 0–2)
BILIRUB SERPL-MCNC: 1 MG/DL (ref 0.2–1)
BUN SERPL-MCNC: 15 MG/DL (ref 7–18)
CALCIUM SERPL-MCNC: 8.1 MG/DL (ref 8.5–10.1)
CHLORIDE SERPL-SCNC: 111 MMOL/L (ref 98–107)
CO2 SERPL-SCNC: 28 MMOL/L (ref 21–32)
CREAT SERPL-MCNC: 0.8 MG/DL (ref 0.55–1.3)
DEPRECATED RDW RBC AUTO: 14.1 % (ref 11.9–15.9)
EOSINOPHIL # BLD: 1.9 % (ref 0–4.5)
GLUCOSE SERPL-MCNC: 130 MG/DL (ref 74–106)
HCT VFR BLD CALC: 37 % (ref 35.4–49)
HGB BLD-MCNC: 12.5 GM/DL (ref 11.7–16.9)
LYMPHOCYTES # BLD: 15.1 % (ref 8–40)
MAGNESIUM SERPL-MCNC: 1.9 MG/DL (ref 1.8–2.4)
MCH RBC QN AUTO: 32.4 PG (ref 25.7–33.7)
MCHC RBC AUTO-ENTMCNC: 33.8 G/DL (ref 32–35.9)
MCV RBC: 96 FL (ref 80–96)
MONOCYTES # BLD AUTO: 7.5 % (ref 3.8–10.2)
NEUTROPHILS # BLD: 74.7 % (ref 42.8–82.8)
PLATELET # BLD AUTO: 268 K/MM3 (ref 134–434)
PMV BLD: 8.8 FL (ref 7.5–11.1)
POTASSIUM SERPLBLD-SCNC: 3.4 MMOL/L (ref 3.5–5.1)
PROT SERPL-MCNC: 5.8 G/DL (ref 6.4–8.2)
RBC # BLD AUTO: 3.85 M/MM3 (ref 4–5.6)
SODIUM SERPL-SCNC: 146 MMOL/L (ref 136–145)
WBC # BLD AUTO: 11.1 K/MM3 (ref 4–10)

## 2020-09-06 RX ADMIN — DOCUSATE SODIUM SCH: 100 CAPSULE, LIQUID FILLED ORAL at 14:18

## 2020-09-06 RX ADMIN — MULTIVITAMIN TABLET SCH TAB: TABLET at 09:35

## 2020-09-06 RX ADMIN — ENOXAPARIN SODIUM SCH MG: 40 INJECTION SUBCUTANEOUS at 09:35

## 2020-09-06 RX ADMIN — DOCUSATE SODIUM SCH: 100 CAPSULE, LIQUID FILLED ORAL at 05:48

## 2020-09-06 RX ADMIN — POLYETHYLENE GLYCOL 3350 SCH GM: 17 POWDER, FOR SOLUTION ORAL at 09:35

## 2020-09-06 RX ADMIN — BUDESONIDE AND FORMOTEROL FUMARATE DIHYDRATE SCH PUFF: 80; 4.5 AEROSOL RESPIRATORY (INHALATION) at 09:35

## 2020-09-06 RX ADMIN — CALCIUM SCH MG: 500 TABLET ORAL at 09:35

## 2020-09-06 RX ADMIN — LISINOPRIL SCH MG: 5 TABLET ORAL at 09:35

## 2020-09-06 RX ADMIN — ASPIRIN SCH MG: 81 TABLET, COATED ORAL at 09:35

## 2020-09-06 NOTE — PN
Progress Note, Physician





- Current Medication List


Current Medications: 


Active Medications





Albuterol/Ipratropium (Duoneb -)  1 amp NEB Q6H PRN


   PRN Reason: SHORTNESS OF BREATH


Aspirin (Ecotrin -)  81 mg PO DAILY [facility]


   Last Admin: 09/05/20 09:25 Dose:  81 mg


   Documented by: 


Atorvastatin Calcium (Lipitor -)  40 mg PO HS [facility]


   Last Admin: 09/03/20 21:47 Dose:  40 mg


   Documented by: 


Budesonide/Formoterol Fumarate (Symbicort 80/4.5mcg -)  2 puff IH BID [facility]


   Last Admin: 09/05/20 22:11 Dose:  2 puff


   Documented by: 


Calcium Carbonate (Os-Noah 500mg -)  500 mg PO BID [facility]


   Last Admin: 09/05/20 22:11 Dose:  500 mg


   Documented by: 


Docusate Sodium (Colace -)  100 mg PO TID [facility]


   Last Admin: 09/06/20 05:48 Dose:  Not Given


   Documented by: 


Enoxaparin Sodium (Lovenox -)  40 mg SQ DAILY [facility]


   Last Admin: 09/05/20 09:24 Dose:  40 mg


   Documented by: 


Ceftriaxone Sodium 1 gm/ (Dextrose)  50 mls @ 100 mls/hr IVPB Q24H [facility]; Protocol


   Last Admin: 09/05/20 22:11 Dose:  100 mls/hr


   Documented by: 


Lisinopril (Prinivil)  5 mg PO DAILY [facility]


   Last Admin: 09/05/20 09:26 Dose:  5 mg


   Documented by: 


Metoprolol Succinate (Toprol Xl -)  25 mg PO DAILY [facility]


   Last Admin: 09/05/20 09:25 Dose:  25 mg


   Documented by: 


Multivitamins/Minerals/Vitamin C (Tab-A-Vit -)  1 tab PO DAILY [facility]


   Last Admin: 09/05/20 09:25 Dose:  1 tab


   Documented by: 


Polyethylene Glycol (Miralax (For Daily Use) -)  17 gm PO DAILY [facility]


   Last Admin: 09/05/20 09:26 Dose:  Not Given


   Documented by: 











- Objective


Vital Signs: 


                                   Vital Signs











Temperature  98.4 F   09/06/20 06:00


 


Pulse Rate  72   09/06/20 06:00


 


Respiratory Rate  19   09/06/20 06:00


 


Blood Pressure  144/59 L  09/06/20 06:00


 


O2 Sat by Pulse Oximetry (%)  92 L  09/06/20 06:00











Eyes: Yes: WNL, Conjunctiva Clear, EOM Intact


HENT: Yes: WNL, Atraumatic, Normocephalic


Neck: Yes: WNL, Supple, Trachea Midline


Cardiovascular: Yes: WNL, Regular Rate and Rhythm


Respiratory: Yes: WNL, Regular, CTA Bilaterally


Gastrointestinal: Yes: WNL, Normal Bowel Sounds


Genitourinary: Yes: WNL


Musculoskeletal: Yes: WNL


Extremities: Yes: WNL


Edema: No


Integumentary: Yes: WNL


Labs: 


                                    CBC, BMP





                                 09/05/20 11:16 





                                 09/05/20 11:16 





                                    INR, PTT











INR  1.28  (0.83-1.09)  H  08/29/20  05:40    














Assessment/Plan








- Problems


(1) CAD (coronary artery disease)


Code(s): I25.10 - ATHSCL HEART DISEASE OF NATIVE CORONARY ARTERY W/O ANG PCTRS  




Qualifiers: 


   Coronary Disease-Associated Artery/Lesion type: native artery   Native vs. 

transplanted heart: native heart   Associated angina: without angina   Qualified

Code(s): I25.10 - Atherosclerotic heart disease of native coronary artery 

without angina pectoris   





(2) COPD (chronic obstructive pulmonary disease)


Code(s): J44.9 - CHRONIC OBSTRUCTIVE PULMONARY DISEASE, UNSPECIFIED   


Qualifiers: 


   COPD type: unspecified COPD   Qualified Code(s): J44.9 - Chronic obstructive 

pulmonary disease, unspecified   





(3) HTN (hypertension)


Code(s): I10 - ESSENTIAL (PRIMARY) HYPERTENSION   


Qualifiers: 


   Hypertension type: essential hypertension   Qualified Code(s): I10 - 

Essential (primary) hypertension   





(4) Diastolic dysfunction


Code(s): I51.89 - OTHER ILL-DEFINED HEART DISEASES   





(5) Demand ischemia


Code(s): I24.8 - OTHER FORMS OF ACUTE ISCHEMIC HEART DISEASE   





(6) Hyperlipidemia


Code(s): E78.5 - HYPERLIPIDEMIA, UNSPECIFIED   


Qualifiers: 


   Hyperlipidemia type: pure hypercholesterolemia   Qualified Code(s): E78.00 - 

Pure hypercholesterolemia, unspecified; E78.0 - Pure hypercholesterolemia   





Assessment/Plan


08/31/2020 Echo: Normal biventricular size and fxn, mild MR, TR, abnl LV compli

ance





1.  Unwitnessed fall unclear mechanical versus a syncopal episode


2.  Coronary artery disease post percutaneous coronary intervention with 

evidence of demand ischemic injury angina pectoris (Patient has not had coronary

artery bypass graft surgery as stated in the notes)


3.  Diastolic left ventricular dysfunction with class 0 New York Heart 

Association classification left ventricular failure


4.  Hypertensive cardiovascular disease


5.  Hypercholesterolemia


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


7.  History of lung surgery partial resection


8.  History of chronic obstructive pulmonary disease


9.  Urinary tract infection


10.  Chronic kidney disease


11.  Abnormal liver function testing


12. Fever on empiric abx





PLAN:





1.  Continue Toprol-XL 25 qd and lisinopril 5 qd with dose titration as 

needed/as tolerated, troponins have plateaued


2.  Continue Lipitor 40 qd and Ecotrin 81 qd, repleted K


3.  Antibiotics as per the primary team


4.  PT->gait training, DVT prophylaxis, dysphagia diet

## 2020-09-06 NOTE — DS
Physical Exam: 


SUBJECTIVE: Patient seen and examined








OBJECTIVE:





                                   Vital Signs











 Period  Temp  Pulse  Resp  BP Sys/Perez  Pulse Ox


 


 Last 24 Hr  97.5 F-98.5 F  62-81  18-21  141-146/52-90  92-99








PHYSICAL EXAM





GENERAL: The patient is awake, alert, and fully oriented, in no acute distress.


HEAD: Normal with no signs of trauma.


EYES: PERRL, extraocular movements intact, sclera anicteric, conjunctiva clear. 


ENT: Ears normal, nares patent, oropharynx clear without exudates, moist mucous 

membranes.


NECK: Trachea midline, full range of motion, supple. 


LUNGS: Breath sounds equal, clear to auscultation bilaterally, no wheezes, no 

crackles, no accessory muscle use. 


HEART: Regular rate and rhythm, S1, S2 without murmur, rub or gallop.


ABDOMEN: Soft, nontender, nondistended, normoactive bowel sounds, no guarding, 

no rebound, no hepatosplenomegaly, no masses.


EXTREMITIES: 2+ pulses, warm, well-perfused, no edema. 


NEUROLOGICAL: Cranial nerves II through XII grossly intact. Normal speech, gait 

not observed.


PSYCH: Normal mood, normal affect.


SKIN: Warm, dry, normal turgor, no rashes or lesions noted.





LABS


                         Laboratory Results - last 24 hr











  09/05/20 09/06/20 09/06/20





  11:16 10:36 10:36


 


WBC   11.1 H 


 


RBC   3.85 L 


 


Hgb   12.5 


 


Hct   37.0 


 


MCV   96.0 


 


MCH   32.4 


 


MCHC   33.8 


 


RDW   14.1 


 


Plt Count   268 


 


MPV   8.8 


 


Absolute Neuts (auto)   8.3 H 


 


Neutrophils %   74.7 


 


Lymphocytes %   15.1 


 


Monocytes %   7.5 


 


Eosinophils %   1.9 


 


Basophils %   0.8 


 


Nucleated RBC %   0 


 


Sodium  146 H   146 H


 


Potassium  3.6   3.4 L


 


Chloride  113 H   111 H


 


Carbon Dioxide  26   28


 


Anion Gap  8   6 L


 


BUN  16.4   15.0


 


Creatinine  0.7   0.8


 


Est GFR (CKD-EPI)AfAm  103.30   97.78


 


Est GFR (CKD-EPI)NonAf  89.13   84.37


 


Random Glucose  96   130 H


 


Calcium  8.0 L   8.1 L


 


Magnesium  1.9   1.9


 


Total Bilirubin  0.8   1.0


 


AST  94 H   74 H


 


ALT  84 H   82 H


 


Alkaline Phosphatase  114   106


 


Total Protein  5.5 L   5.8 L


 


Albumin  2.3 L   2.4 L











HOSPITAL COURSE:





Date of Admission:08/28/20





Date of Discharge: 09/06/20








Patient is an 79 year old male with a significant past medical history of COPD, 

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

a fall.  on admission patient also noted to have toxic metabolic encephalopathy 

with moderate protein calorie malnutrition.   He was found to have Klebsiella 

UTI and on ceftriaxone. 








 Problem List





- Problems


(1) UTI due to Klebsiella species


Assessment/Plan: 


-completed rocephin 1gr daily 


Code(s): N39.0 - URINARY TRACT INFECTION, SITE NOT SPECIFIED; B96.89 - OTH 

BACTERIAL AGENTS AS THE CAUSE OF DISEASES CLASSD ELSWHR   





(2) Acute metabolic encephalopathy


Assessment/Plan: 


head ct negative, blood cultures negative to date


UC + Kleb, completed rocephin 


ID following


swallow eval showed need for aspiration precautions with nectar thick fluids


monitor mental status


Code(s): G93.41 - METABOLIC ENCEPHALOPATHY   





(3) CAD (coronary artery disease)


Assessment/Plan: 


on lipitor


Code(s): I25.10 - ATHSCL HEART DISEASE OF NATIVE CORONARY ARTERY W/O ANG PCTRS  




Qualifiers: 


   Coronary Disease-Associated Artery/Lesion type: native artery   Native vs. 

transplanted heart: native heart   Associated angina: without angina   Qualified

Code(s): I25.10 - Atherosclerotic heart disease of native coronary artery 

without angina pectoris   





(4) COPD (chronic obstructive pulmonary disease)


Assessment/Plan: 


mild wheezing of anterior upper lobes, diminished otherwise.  on 2 liters of 

nasal cannula


pulmonary consulted and following 


chest ct shows moderate to severe COPD. on symbicort.


Code(s): J44.9 - CHRONIC OBSTRUCTIVE PULMONARY DISEASE, UNSPECIFIED   


Qualifiers: 


   COPD type: unspecified COPD   Qualified Code(s): J44.9 - Chronic obstructive 

pulmonary disease, unspecified   





(5) Constipation


Assessment/Plan: 


bowel regimen ordered


Code(s): K59.00 - CONSTIPATION, UNSPECIFIED   





(6) Demand ischemia


Assessment/Plan: 


cardiology following


Code(s): I24.8 - OTHER FORMS OF ACUTE ISCHEMIC HEART DISEASE   





(7) Diastolic dysfunction


Assessment/Plan: 


no signs of volume overload.  cardiology following


lisinopril added


Code(s): I51.89 - OTHER ILL-DEFINED HEART DISEASES   





(8) Moderate protein-calorie malnutrition


Assessment/Plan: 


on supplemnts, RD following


Code(s): E44.0 - MODERATE PROTEIN-CALORIE MALNUTRITION   





(9) Prophylactic measure


Code(s): Z29.9 - ENCOUNTER FOR PROPHYLACTIC MEASURES, UNSPECIFIED   





(10) Rhabdomyolysis


Assessment/Plan: 


CPK improving, daily labs.


Code(s): M62.82 - RHABDOMYOLYSIS   





(11) Fall


Assessment/Plan: 


physical therapy evaluation. patient for SNF placement


Code(s): W19.XXXA - UNSPECIFIED FALL, INITIAL ENCOUNTER   





(12) Elevated liver enzymes-stable 


Assessment/Plan: 


per ultrasound, mild fatty infiltration of the liver noted.


abdominal aortic anyeursm wit stent and possible gallbladder polyp


liver enzymes trending down


Code(s): R74.8 - ABNORMAL LEVELS OF OTHER SERUM ENZYMES   





(13) DVT prophylaxis


Assessment/Plan: 


on lovenox


Code(s): Z29.9 - ENCOUNTER FOR PROPHYLACTIC MEASURES, UNSPECIFIED   





Minutes to complete discharge: 30





Discharge Summary


Problems reviewed: Yes


Reason For Visit: UTI,ELEVATED TROPONIN LEVEL,FALL


Current Active Problems





ACS (acute coronary syndrome) (Acute)


Acute metabolic encephalopathy (Acute)


CAD (coronary artery disease) (Acute)


COPD (chronic obstructive pulmonary disease) (Acute)


Constipation (Acute)


DVT prophylaxis (Acute)


Demand ischemia (Acute)


Diastolic dysfunction (Acute)


Elevated liver enzymes (Acute)


Fall (Acute)


Fall (Acute)


H/O pneumonectomy (Acute)


HTN (hypertension) (Acute)


Hyperlipidemia (Acute)


Moderate protein-calorie malnutrition (Acute)


Prophylactic measure (Acute)


Rhabdomyolysis (Acute)


S/P CABG (coronary artery bypass graft) (Acute)


Seborrheic keratosis (Acute)


Suspected COVID-19 virus infection (Acute)


UTI (urinary tract infection) (Acute)


UTI due to Klebsiella species (Acute)


Weakness (Acute)








Condition: Stable





- Instructions


Diet, Activity, Other Instructions: 


diet: dysphasia, chopped diet, ensure enlive, magic cup


aspiration precautions  





- Home Medications


Comprehensive Discharge Medication List: 


Ambulatory Orders





Albuterol 2.5/Ipratropium 0.5 [Duoneb -] 1 amp NEB Q6H PRN  amp 09/06/20 


Aspirin Coated [Ecotrin -] 81 mg PO DAILY  tablet.ec 09/06/20 


Atorvastatin Ca [Lipitor] 40 mg PO HS  tablet 09/06/20 


Bacitracin - [Bacitracin Topical Ointment -] 1 applic TP DAILY  tube 09/06/20 


Budesonide/Formeterol Fumarate [SYMBICORT 80/4.5mcg -] 2 puff IH BID  inhaler 

09/06/20 


Calcium (Oyster Shell) [Os-Noah 500MG -] 500 mg PO BID  tablet 09/06/20 


Docusate Sodium [Colace -] 100 mg PO TID  capsule 09/06/20 


Lisinopril [Prinivil] 5 mg PO DAILY  tablet 09/06/20 


Metoprolol Succinate [Toprol XL -] 25 mg PO DAILY  tab.sr.24h 09/06/20 


Multivitamins [Multivit ([facility] Formulary)] 1 tab PO DAILY  tab 09/06/20 


Polyethylene Glycol 3350 [Miralax 119 gm Btl -] 17 gm PO DAILY  bottle 09/06/20 








This patient is new to me today: No


Emergency Visit: Yes


ED Registration Date: 08/28/20


Care time: The patient presented to the Emergency Department on the above date 

and was hospitalized for further evaluation of their emergent condition.


Critical Care patient: No





- Discharge Referral


Referred to [facility] No

## 2020-09-06 NOTE — PN
Physical Exam: 


SUBJECTIVE: Patient seen and examined, alert, x 1-2 periods of confusion, no 

agitation, 





small skin tear noted on bridge of nose, dressing applied








OBJECTIVE:





                                   Vital Signs











 Period  Temp  Pulse  Resp  BP Sys/Perez  Pulse Ox


 


 Last 24 Hr  97.5 F-98.5 F  62-81  18-21  141-146/52-90  92-99











GENERAL: The patient is awake, alert, oriented x1, no agitation 


HEAD: Normal with no signs of trauma.


EYES: PERRL, extraocular movements intact, sclera anicteric, conjunctiva clear. 

No ptosis. 


ENT: Ears normal, nares patent, oropharynx clear without exudates, moist mucous 


membranes.


NECK: Trachea midline, full range of motion, supple. 


LUNGS: Breath sounds equal, clear to auscultation bilaterally, no wheezes, no 

crackles, no 


accessory muscle use. 


HEART: Regular rate and rhythm, S1, S2 without murmur, rub or gallop.


ABDOMEN: Soft, nontender, nondistended, normoactive bowel sounds, no guarding, 

no 


rebound, no hepatosplenomegaly, no masses.


EXTREMITIES: 2+ pulses, warm, well-perfused, no edema. 


NEUROLOGICAL: Cranial nerves II through XII grossly intact. Normal speech, gait 

not 


observed.


PSYCH: Normal mood, normal affect.


SKIN: Warm, dry, normal turgor, no rashes or lesions noted














                         Laboratory Results - last 24 hr











  09/05/20 09/05/20





  11:16 11:16


 


WBC  9.4 


 


RBC  3.86 L 


 


Hgb  12.6 


 


Hct  37.4 


 


MCV  96.9 H 


 


MCH  32.7 


 


MCHC  33.8 


 


RDW  14.1 


 


Plt Count  235  D 


 


MPV  8.9 


 


Absolute Neuts (auto)  6.8 


 


Neutrophils %  72.6 


 


Lymphocytes %  15.8 


 


Monocytes %  8.7 


 


Eosinophils %  2.0 


 


Basophils %  0.9 


 


Nucleated RBC %  0 


 


Sodium   146 H


 


Potassium   3.6


 


Chloride   113 H


 


Carbon Dioxide   26


 


Anion Gap   8


 


BUN   16.4


 


Creatinine   0.7


 


Est GFR (CKD-EPI)AfAm   103.30


 


Est GFR (CKD-EPI)NonAf   89.13


 


Random Glucose   96


 


Calcium   8.0 L


 


Magnesium   1.9


 


Total Bilirubin   0.8


 


AST   94 H


 


ALT   84 H


 


Alkaline Phosphatase   114


 


Total Protein   5.5 L


 


Albumin   2.3 L








Active Medications











Generic Name Dose Route Start Last Admin





  Trade Name Freq  PRN Reason Stop Dose Admin


 


Albuterol/Ipratropium  1 amp  09/03/20 20:10 





  Duoneb -  NEB  





  Q6H PRN  





  SHORTNESS OF BREATH  


 


Aspirin  81 mg  09/04/20 10:00  09/06/20 09:35





  Ecotrin -  PO   81 mg





  DAILY MARISSA   Administration


 


Atorvastatin Calcium  40 mg  09/03/20 22:00  09/03/20 21:47





  Lipitor -  PO   40 mg





  HS MARISSA   Administration


 


Bacitracin  1 applic  09/06/20 11:15 





  Bacitracin -  TP  09/10/20 10:00 





  DAILY MARISSA  


 


Budesonide/Formoterol Fumarate  2 puff  09/03/20 22:00  09/06/20 09:35





  Symbicort 80/4.5mcg -  IH   2 puff





  BID MARISSA   Administration


 


Calcium Carbonate  500 mg  09/03/20 22:00  09/06/20 09:35





  Os-Noah 500mg -  PO   500 mg





  BID MARISSA   Administration


 


Docusate Sodium  100 mg  09/03/20 22:00  09/06/20 05:48





  Colace -  PO   Not Given





  TID MARISSA  


 


Enoxaparin Sodium  40 mg  09/04/20 10:00  09/06/20 09:35





  Lovenox -  SQ   40 mg





  DAILY MARISSA   Administration


 


Ceftriaxone Sodium 1 gm/  50 mls @ 100 mls/hr  09/03/20 22:00  09/05/20 22:11





  Dextrose  IVPB   100 mls/hr





  Q24H MARISSA   Administration





  Protocol  


 


Lisinopril  5 mg  09/04/20 10:00  09/06/20 09:35





  Prinivil  PO   5 mg





  DAILY MARISSA   Administration


 


Metoprolol Succinate  25 mg  09/04/20 10:00  09/06/20 09:36





  Toprol Xl -  PO   25 mg





  DAILY MARISSA   Administration


 


Multivitamins/Minerals/Vitamin C  1 tab  09/04/20 10:00  09/06/20 09:35





  Tab-A-Vit -  PO   1 tab





  DAILY MARISSA   Administration


 


Polyethylene Glycol  17 gm  09/04/20 10:00  09/06/20 09:35





  Miralax (For Daily Use) -  PO   17 gm





  DAILY MARISSA   Administration











ASSESSMENT/PLAN:


Patient is an 79 year old male with a significant past medical history of COPD, 

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

a fall.  on admission patient also noted to have toxic metabolic encephalopathy 

with moderate protein calorie malnutrition.   He was found to have Klebsiella 

UTI and on ceftriaxone. 








 Problem List





- Problems


(1) UTI due to Klebsiella species


Assessment/Plan: 


-on rocephin,  continue Iv per ID


Code(s): N39.0 - URINARY TRACT INFECTION, SITE NOT SPECIFIED; B96.89 - OTH 

BACTERIAL AGENTS AS THE CAUSE OF DISEASES CLASSD ELSWHR   





(2) Acute metabolic encephalopathy


Assessment/Plan: 


head ct negative, blood cultures negative to date


UC + Kleb, on Ceftriaxone 1 gram daily


ID following


swallow eval showed need for aspiration precautions with nectar thick fluids


monitor mental status


Code(s): G93.41 - METABOLIC ENCEPHALOPATHY   





(3) CAD (coronary artery disease)


Assessment/Plan: 


on lipitor


Code(s): I25.10 - ATHSCL HEART DISEASE OF NATIVE CORONARY ARTERY W/O ANG PCTRS  




Qualifiers: 


   Coronary Disease-Associated Artery/Lesion type: native artery   Native vs. 

transplanted heart: native heart   Associated angina: without angina   Qualified

Code(s): I25.10 - Atherosclerotic heart disease of native coronary artery 

without angina pectoris   





(4) COPD (chronic obstructive pulmonary disease)


Assessment/Plan: 


mild wheezing of anterior upper lobes, diminished otherwise.  on 2 liters of na

sal cannula


pulmonary consulted and following 


chest ct shows moderate to severe COPD. on symbicort.


Code(s): J44.9 - CHRONIC OBSTRUCTIVE PULMONARY DISEASE, UNSPECIFIED   


Qualifiers: 


   COPD type: unspecified COPD   Qualified Code(s): J44.9 - Chronic obstructive 

pulmonary disease, unspecified   





(5) Constipation


Assessment/Plan: 


bowel regimen ordered


Code(s): K59.00 - CONSTIPATION, UNSPECIFIED   





(6) Demand ischemia


Assessment/Plan: 


cardiology following


Code(s): I24.8 - OTHER FORMS OF ACUTE ISCHEMIC HEART DISEASE   





(7) Diastolic dysfunction


Assessment/Plan: 


no signs of volume overload.  cardiology following


lisinopril added


Code(s): I51.89 - OTHER ILL-DEFINED HEART DISEASES   





(8) Moderate protein-calorie malnutrition


Assessment/Plan: 


on supplemnts, RD following


Code(s): E44.0 - MODERATE PROTEIN-CALORIE MALNUTRITION   





(9) Prophylactic measure


Code(s): Z29.9 - ENCOUNTER FOR PROPHYLACTIC MEASURES, UNSPECIFIED   





(10) Rhabdomyolysis


Assessment/Plan: 


CPK improving, daily labs.


Code(s): M62.82 - RHABDOMYOLYSIS   





(11) Fall


Assessment/Plan: 


physical therapy evaluation. patient for SNF placement


Code(s): W19.XXXA - UNSPECIFIED FALL, INITIAL ENCOUNTER   





(12) Elevated liver enzymes


Assessment/Plan: 


per ultrasound, mild fatty infiltration of the liver noted.


abdominal aortic anyeursm wit stent and possible gallbladder polyp


liver enzymes trending down


Code(s): R74.8 - ABNORMAL LEVELS OF OTHER SERUM ENZYMES   





(13) DVT prophylaxis


Assessment/Plan: 


on lovenox


Code(s): Z29.9 - ENCOUNTER FOR PROPHYLACTIC MEASURES, UNSPECIFIED   





Visit type





- Emergency Visit


Emergency Visit: Yes


ED Registration Date: 08/28/20


Care time: The patient presented to the Emergency Department on the above date 

and was hospitalized for further evaluation of their emergent condition.





- New Patient


This patient is new to me today: No





- Critical Care


Critical Care patient: No





- Medication Review


Med list reviewed for High Risk Meds patients 65 and older: No

## 2020-09-06 NOTE — PN
Progress Note (short form)





- Note


Progress Note: 








 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 

lung nodule who was BIB his son after a fall amd ? syncope. 


Patient has also being treated for confusion and possible deliriums. THere is 

family hisotry of Parkinson disease. I was asked toe valuate if he has parkinson

disease or lewy body disease. Patient did get confused during hospitalization . 

He has no history  parkinson disease.


-- no new symptoms, spoke to Nursing staff, chart reviewed 








Medication , SH,FH, Ros reviewed in chart


NEUROLOGICAL EXAMINATION


Alert oriented x 1, neck is supple vss


eomi, pupils reactive no face asymmetry


moving all ext


no rigidity or tremors identified








ct head showed small meningioma








Assessment/Plan 79 year old male hisotyr of   COPD, HTN, CAD, lung resection 10 

yrs ago s/p TB lung nodule  came for syncope and has pneumonia and uti. 


No evidence of PD at this time, he seems to suffer frm dementia, and delirum 

during this hospitalization 





Plan: watch it for now, would not benefit from sinemet at this time.


- continue current level of care





Thanking you so much


Bello Mcadams MD

## 2020-09-06 NOTE — PN
Progress Note, Physician


History of Present Illness: 





PULMONARY





AWAKE ,CONFUSED,-RESP DISTRESS





- Current Medication List


Current Medications: 


Active Medications





Albuterol/Ipratropium (Duoneb -)  1 amp NEB Q6H PRN


   PRN Reason: SHORTNESS OF BREATH


Aspirin (Ecotrin -)  81 mg PO DAILY [facility]


   Last Admin: 09/05/20 09:25 Dose:  81 mg


   Documented by: 


Atorvastatin Calcium (Lipitor -)  40 mg PO HS [facility]


   Last Admin: 09/03/20 21:47 Dose:  40 mg


   Documented by: 


Budesonide/Formoterol Fumarate (Symbicort 80/4.5mcg -)  2 puff IH BID [facility]


   Last Admin: 09/05/20 22:11 Dose:  2 puff


   Documented by: 


Calcium Carbonate (Os-Noah 500mg -)  500 mg PO BID [facility]


   Last Admin: 09/05/20 22:11 Dose:  500 mg


   Documented by: 


Docusate Sodium (Colace -)  100 mg PO TID [facility]


   Last Admin: 09/06/20 05:48 Dose:  Not Given


   Documented by: 


Enoxaparin Sodium (Lovenox -)  40 mg SQ DAILY [facility]


   Last Admin: 09/05/20 09:24 Dose:  40 mg


   Documented by: 


Ceftriaxone Sodium 1 gm/ (Dextrose)  50 mls @ 100 mls/hr IVPB Q24H [facility]; Protocol


   Last Admin: 09/05/20 22:11 Dose:  100 mls/hr


   Documented by: 


Lisinopril (Prinivil)  5 mg PO DAILY [facility]


   Last Admin: 09/05/20 09:26 Dose:  5 mg


   Documented by: 


Metoprolol Succinate (Toprol Xl -)  25 mg PO DAILY [facility]


   Last Admin: 09/05/20 09:25 Dose:  25 mg


   Documented by: 


Multivitamins/Minerals/Vitamin C (Tab-A-Vit -)  1 tab PO DAILY [facility]


   Last Admin: 09/05/20 09:25 Dose:  1 tab


   Documented by: 


Polyethylene Glycol (Miralax (For Daily Use) -)  17 gm PO DAILY [facility]


   Last Admin: 09/05/20 09:26 Dose:  Not Given


   Documented by: 











- Objective


Vital Signs: 


                                   Vital Signs











Temperature  98.4 F   09/06/20 06:00


 


Pulse Rate  72   09/06/20 06:00


 


Respiratory Rate  19   09/06/20 06:00


 


Blood Pressure  144/59 L  09/06/20 06:00


 


O2 Sat by Pulse Oximetry (%)  92 L  09/06/20 06:00











Constitutional: Yes: Well Nourished, Calm, Thin


Eyes: Yes: WNL


HENT: Yes: WNL


Neck: Yes: WNL


Cardiovascular: Yes: Regular Rate and Rhythm, S1, S2


Respiratory: Yes: Rhonchi (FEW RHONCHI)


Gastrointestinal: Yes: Normal Bowel Sounds, Soft


Extremities: Yes: WNL


Edema: No


Labs: 


                                    CBC, BMP





                                 09/05/20 11:16 





                                 09/05/20 11:16 





                                    INR, PTT











INR  1.28  (0.83-1.09)  H  08/29/20  05:40    














Problem List





- Problems


(1) Acute metabolic encephalopathy


Code(s): G93.41 - METABOLIC ENCEPHALOPATHY   





(2) CAD (coronary artery disease)


Code(s): I25.10 - ATHSCL HEART DISEASE OF NATIVE CORONARY ARTERY W/O ANG PCTRS  




Qualifiers: 


   Coronary Disease-Associated Artery/Lesion type: native artery   Native vs. 

transplanted heart: native heart   Associated angina: without angina   Qualified

Code(s): I25.10 - Atherosclerotic heart disease of native coronary artery 

without angina pectoris   





(3) COPD (chronic obstructive pulmonary disease)


Code(s): J44.9 - CHRONIC OBSTRUCTIVE PULMONARY DISEASE, UNSPECIFIED   


Qualifiers: 


   COPD type: unspecified COPD   Qualified Code(s): J44.9 - Chronic obstructive 

pulmonary disease, unspecified   





(4) Demand ischemia


Code(s): I24.8 - OTHER FORMS OF ACUTE ISCHEMIC HEART DISEASE   





(5) Diastolic dysfunction


Code(s): I51.89 - OTHER ILL-DEFINED HEART DISEASES   





(6) Fall


Code(s): W19.XXXA - UNSPECIFIED FALL, INITIAL ENCOUNTER   





(7) HTN (hypertension)


Code(s): I10 - ESSENTIAL (PRIMARY) HYPERTENSION   


Qualifiers: 


   Hypertension type: essential hypertension   Qualified Code(s): I10 - 

Essential (primary) hypertension   





(8) Hyperlipidemia


Code(s): E78.5 - HYPERLIPIDEMIA, UNSPECIFIED   


Qualifiers: 


   Hyperlipidemia type: pure hypercholesterolemia   Qualified Code(s): E78.00 - 

Pure hypercholesterolemia, unspecified; E78.0 - Pure hypercholesterolemia   





(9) UTI (urinary tract infection)


Code(s): N39.0 - URINARY TRACT INFECTION, SITE NOT SPECIFIED   


Qualifiers: 


   Urinary tract infection type: site unspecified   Hematuria presence: with 

hematuria   Qualified Code(s): N39.0 - Urinary tract infection, site not sp

ecified; R31.9 - Hematuria, unspecified   





(10) Weakness


Code(s): R53.1 - WEAKNESS   





Assessment/Plan








IMP  S/P FALL ?SYNCOPE


      R/O RLL INFILTRATE LIKELY ASPIRATION


      ALTERED MENTAL STATUS improving


      COPD  


      UTI


      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


      HTN


      ASHD


      + TROPONIN LIKELY DEMAND ISCHEMIA


      ELEVATED LFTS





PLAN  SUPPLEMENTAL O2


         INHALED BRONCHODILATORS


         ABX AS PER ID


        MONITOR LYTES


        ASPIRATION PRECAUTIONS


       





DR MONTEJO





 Problem List 





- Problems


(1) Acute metabolic encephalopathy


Code(s): G93.41 - METABOLIC ENCEPHALOPATHY   





(2) CAD (coronary artery disease)


Code(s): I25.10 - ATHSCL HEART DISEASE OF NATIVE CORONARY ARTERY W/O ANG PCTRS  




Qualifiers: 


   Coronary Disease-Associated Artery/Lesion type: native artery   Native vs. 

transplanted heart: native heart   Associated angina: without angina   Qualified

Code(s): I25.10 - Atherosclerotic heart disease of native coronary artery 

without angina pectoris   





(3) COPD (chronic obstructive pulmonary disease)


Code(s): J44.9 - CHRONIC OBSTRUCTIVE PULMONARY DISEASE, UNSPECIFIED   


Qualifiers: 


   COPD type: unspecified COPD   Qualified Code(s): J44.9 - Chronic obstructive 

pulmonary disease, unspecified   





(4) Demand ischemia


Code(s): I24.8 - OTHER FORMS OF ACUTE ISCHEMIC HEART DISEASE   





(5) Diastolic dysfunction


Code(s): I51.89 - OTHER ILL-DEFINED HEART DISEASES   





(6) Fall


Code(s): W19.XXXA - UNSPECIFIED FALL, INITIAL ENCOUNTER   





(7) HTN (hypertension)


Code(s): I10 - ESSENTIAL (PRIMARY) HYPERTENSION   


Qualifiers: 


   Hypertension type: essential hypertension   Qualified Code(s): I10 - 

Essential (primary) hypertension   





(8) Hyperlipidemia


Code(s): E78.5 - HYPERLIPIDEMIA, UNSPECIFIED   


Qualifiers: 


   Hyperlipidemia type: pure hypercholesterolemia   Qualified Code(s): E78.00 - 

Pure hypercholesterolemia, unspecified; E78.0 - Pure hypercholesterolemia   





(9) UTI (urinary tract infection)


Code(s): N39.0 - URINARY TRACT INFECTION, SITE NOT SPECIFIED   


Qualifiers: 


   Urinary tract infection type: site unspecified   Hematuria presence: with 

hematuria   Qualified Code(s): N39.0 - Urinary tract infection, site not 

specified; R31.9 - Hematuria, unspecified   





(10) Weakness


Code(s): R53.1 - WEAKNESS

## 2020-12-05 ENCOUNTER — HOSPITAL ENCOUNTER (INPATIENT)
Dept: HOSPITAL 74 - JER | Age: 80
LOS: 16 days | Discharge: HOSPICE | DRG: 871 | End: 2020-12-21
Attending: NURSE PRACTITIONER | Admitting: INTERNAL MEDICINE
Payer: COMMERCIAL

## 2020-12-05 VITALS — BODY MASS INDEX: 14.3 KG/M2

## 2020-12-05 DIAGNOSIS — I25.10: ICD-10-CM

## 2020-12-05 DIAGNOSIS — A41.9: Primary | ICD-10-CM

## 2020-12-05 DIAGNOSIS — D64.9: ICD-10-CM

## 2020-12-05 DIAGNOSIS — G92: ICD-10-CM

## 2020-12-05 DIAGNOSIS — R64: ICD-10-CM

## 2020-12-05 DIAGNOSIS — D72.829: ICD-10-CM

## 2020-12-05 DIAGNOSIS — J96.01: ICD-10-CM

## 2020-12-05 DIAGNOSIS — E43: ICD-10-CM

## 2020-12-05 DIAGNOSIS — N17.9: ICD-10-CM

## 2020-12-05 DIAGNOSIS — G93.41: ICD-10-CM

## 2020-12-05 DIAGNOSIS — R62.7: ICD-10-CM

## 2020-12-05 DIAGNOSIS — R65.20: ICD-10-CM

## 2020-12-05 DIAGNOSIS — J18.9: ICD-10-CM

## 2020-12-05 DIAGNOSIS — J44.9: ICD-10-CM

## 2020-12-05 PROCEDURE — U0003 INFECTIOUS AGENT DETECTION BY NUCLEIC ACID (DNA OR RNA); SEVERE ACUTE RESPIRATORY SYNDROME CORONAVIRUS 2 (SARS-COV-2) (CORONAVIRUS DISEASE [COVID-19]), AMPLIFIED PROBE TECHNIQUE, MAKING USE OF HIGH THROUGHPUT TECHNOLOGIES AS DESCRIBED BY CMS-2020-01-R: HCPCS

## 2020-12-05 PROCEDURE — C9803 HOPD COVID-19 SPEC COLLECT: HCPCS

## 2020-12-05 PROCEDURE — G0480 DRUG TEST DEF 1-7 CLASSES: HCPCS

## 2020-12-06 LAB
ALBUMIN SERPL-MCNC: 2 G/DL (ref 3.4–5)
ALBUMIN SERPL-MCNC: 2.3 G/DL (ref 3.4–5)
ALP SERPL-CCNC: 110 U/L (ref 45–117)
ALP SERPL-CCNC: 129 U/L (ref 45–117)
ALT SERPL-CCNC: 17 U/L (ref 13–61)
ALT SERPL-CCNC: 23 U/L (ref 13–61)
ANION GAP SERPL CALC-SCNC: 8 MMOL/L (ref 8–16)
ANION GAP SERPL CALC-SCNC: 9 MMOL/L (ref 8–16)
ANISOCYTOSIS BLD QL: (no result)
ANISOCYTOSIS BLD QL: 0
APTT BLD: 36.9 SECONDS (ref 25.2–36.5)
AST SERPL-CCNC: 17 U/L (ref 15–37)
AST SERPL-CCNC: 71 U/L (ref 15–37)
BASE EXCESS BLDV CALC-SCNC: -2.2 MMOL/L (ref -2–2)
BASOPHILS # BLD: 0.1 % (ref 0–2)
BASOPHILS # BLD: 0.3 % (ref 0–2)
BILIRUB DIRECT SERPL-MCNC: 695 U/L (ref 87–246)
BILIRUB SERPL-MCNC: 0.4 MG/DL (ref 0.2–1)
BILIRUB SERPL-MCNC: 0.8 MG/DL (ref 0.2–1)
BNP SERPL-MCNC: 2926.2 PG/ML (ref 5–450)
BUN SERPL-MCNC: 53.4 MG/DL (ref 7–18)
BUN SERPL-MCNC: 54.8 MG/DL (ref 7–18)
CALCIUM SERPL-MCNC: 8.4 MG/DL (ref 8.5–10.1)
CALCIUM SERPL-MCNC: 8.9 MG/DL (ref 8.5–10.1)
CHLORIDE SERPL-SCNC: 123 MMOL/L (ref 98–107)
CHLORIDE SERPL-SCNC: 123 MMOL/L (ref 98–107)
CO2 SERPL-SCNC: 24 MMOL/L (ref 21–32)
CO2 SERPL-SCNC: 27 MMOL/L (ref 21–32)
CREAT SERPL-MCNC: 1.5 MG/DL (ref 0.55–1.3)
CREAT SERPL-MCNC: 1.7 MG/DL (ref 0.55–1.3)
DEPRECATED RDW RBC AUTO: 14.8 % (ref 11.9–15.9)
DEPRECATED RDW RBC AUTO: 15.2 % (ref 11.9–15.9)
EOSINOPHIL # BLD: 0 % (ref 0–4.5)
EOSINOPHIL # BLD: 0.1 % (ref 0–4.5)
ERYTHROCYTE [SEDIMENTATION RATE] IN BLOOD BY WESTERGREN METHOD: 96 MM/HR (ref 0–20)
GLUCOSE SERPL-MCNC: 123 MG/DL (ref 74–106)
GLUCOSE SERPL-MCNC: 156 MG/DL (ref 74–106)
HCT VFR BLD CALC: 31.7 % (ref 35.4–49)
HCT VFR BLD CALC: 37.8 % (ref 35.4–49)
HGB BLD-MCNC: 10 GM/DL (ref 11.7–16.9)
HGB BLD-MCNC: 11.9 GM/DL (ref 11.7–16.9)
INR BLD: 1.41 (ref 0.83–1.09)
LYMPHOCYTES # BLD: 1.9 % (ref 8–40)
LYMPHOCYTES # BLD: 3.6 % (ref 8–40)
MACROCYTES BLD QL: (no result)
MACROCYTES BLD QL: 0
MAGNESIUM SERPL-MCNC: 2.5 MG/DL (ref 1.8–2.4)
MCH RBC QN AUTO: 32.3 PG (ref 25.7–33.7)
MCH RBC QN AUTO: 32.6 PG (ref 25.7–33.7)
MCHC RBC AUTO-ENTMCNC: 31.5 G/DL (ref 32–35.9)
MCHC RBC AUTO-ENTMCNC: 31.7 G/DL (ref 32–35.9)
MCV RBC: 102.6 FL (ref 80–96)
MCV RBC: 102.8 FL (ref 80–96)
MONOCYTES # BLD AUTO: 1.1 % (ref 3.8–10.2)
MONOCYTES # BLD AUTO: 3.8 % (ref 3.8–10.2)
NEUTROPHILS # BLD: 92.2 % (ref 42.8–82.8)
NEUTROPHILS # BLD: 96.9 % (ref 42.8–82.8)
PCO2 BLDV: 60.8 MMHG (ref 38–52)
PH BLDV: 7.25 [PH] (ref 7.31–7.41)
PHOSPHATE SERPL-MCNC: 5.6 MG/DL (ref 2.5–4.9)
PLATELET # BLD AUTO: 314 K/MM3 (ref 134–434)
PLATELET # BLD AUTO: 380 K/MM3 (ref 134–434)
PLATELET BLD QL SMEAR: NORMAL
PLATELET BLD QL SMEAR: NORMAL
PMV BLD: 9 FL (ref 7.5–11.1)
PMV BLD: 9.8 FL (ref 7.5–11.1)
PROT SERPL-MCNC: 6.7 G/DL (ref 6.4–8.2)
PROT SERPL-MCNC: 8 G/DL (ref 6.4–8.2)
PT PNL PPP: 17.2 SEC (ref 9.7–13)
RBC # BLD AUTO: 3.08 M/MM3 (ref 4–5.6)
RBC # BLD AUTO: 3.68 M/MM3 (ref 4–5.6)
SAO2 % BLDV: 30.6 % (ref 70–80)
SODIUM SERPL-SCNC: 157 MMOL/L (ref 136–145)
SODIUM SERPL-SCNC: 158 MMOL/L (ref 136–145)
WBC # BLD AUTO: 28.5 K/MM3 (ref 4–10)
WBC # BLD AUTO: 31 K/MM3 (ref 4–10)

## 2020-12-06 RX ADMIN — DOXYCYCLINE SCH MLS/HR: 100 INJECTION, POWDER, LYOPHILIZED, FOR SOLUTION INTRAVENOUS at 21:36

## 2020-12-06 RX ADMIN — PIPERACILLIN SODIUM AND TAZOBACTAM SODIUM SCH: .25; 2 INJECTION, POWDER, LYOPHILIZED, FOR SOLUTION INTRAVENOUS at 17:19

## 2020-12-06 RX ADMIN — VITAMIN D, TAB 1000IU (100/BT) SCH: 25 TAB at 09:34

## 2020-12-06 RX ADMIN — Medication SCH: at 09:33

## 2020-12-06 RX ADMIN — SODIUM CHLORIDE SCH MLS/HR: 4.5 INJECTION, SOLUTION INTRAVENOUS at 09:20

## 2020-12-06 RX ADMIN — OXYCODONE HYDROCHLORIDE AND ACETAMINOPHEN SCH: 500 TABLET ORAL at 09:34

## 2020-12-06 RX ADMIN — SODIUM CHLORIDE SCH MLS/HR: 4.5 INJECTION, SOLUTION INTRAVENOUS at 15:46

## 2020-12-06 RX ADMIN — Medication SCH MG: at 21:36

## 2020-12-06 RX ADMIN — PIPERACILLIN SODIUM AND TAZOBACTAM SODIUM SCH MLS/HR: .25; 2 INJECTION, POWDER, LYOPHILIZED, FOR SOLUTION INTRAVENOUS at 15:46

## 2020-12-06 RX ADMIN — OXYCODONE HYDROCHLORIDE AND ACETAMINOPHEN SCH MG: 500 TABLET ORAL at 21:36

## 2020-12-07 LAB
ALBUMIN SERPL-MCNC: 2 G/DL (ref 3.4–5)
ALP SERPL-CCNC: 95 U/L (ref 45–117)
ALT SERPL-CCNC: 17 U/L (ref 13–61)
ANION GAP SERPL CALC-SCNC: 6 MMOL/L (ref 8–16)
ANISOCYTOSIS BLD QL: (no result)
AST SERPL-CCNC: 16 U/L (ref 15–37)
BASOPHILS # BLD: 0.1 % (ref 0–2)
BILIRUB DIRECT SERPL-MCNC: 176 U/L (ref 87–246)
BILIRUB SERPL-MCNC: 0.7 MG/DL (ref 0.2–1)
BUN SERPL-MCNC: 53.1 MG/DL (ref 7–18)
CALCIUM SERPL-MCNC: 8.2 MG/DL (ref 8.5–10.1)
CHLORIDE SERPL-SCNC: 127 MMOL/L (ref 98–107)
CO2 SERPL-SCNC: 25 MMOL/L (ref 21–32)
CREAT SERPL-MCNC: 1.2 MG/DL (ref 0.55–1.3)
DEPRECATED RDW RBC AUTO: 14.9 % (ref 11.9–15.9)
EOSINOPHIL # BLD: 0 % (ref 0–4.5)
GLUCOSE SERPL-MCNC: 108 MG/DL (ref 74–106)
HCT VFR BLD CALC: 31.6 % (ref 35.4–49)
HGB BLD-MCNC: 10 GM/DL (ref 11.7–16.9)
LYMPHOCYTES # BLD: 5.4 % (ref 8–40)
MACROCYTES BLD QL: (no result)
MAGNESIUM SERPL-MCNC: 2.7 MG/DL (ref 1.8–2.4)
MCH RBC QN AUTO: 32.3 PG (ref 25.7–33.7)
MCHC RBC AUTO-ENTMCNC: 31.7 G/DL (ref 32–35.9)
MCV RBC: 101.8 FL (ref 80–96)
MONOCYTES # BLD AUTO: 2.4 % (ref 3.8–10.2)
NEUTROPHILS # BLD: 92.1 % (ref 42.8–82.8)
PLATELET # BLD AUTO: 326 K/MM3 (ref 134–434)
PLATELET BLD QL SMEAR: NORMAL
PMV BLD: 9.5 FL (ref 7.5–11.1)
PROT SERPL-MCNC: 6.4 G/DL (ref 6.4–8.2)
RBC # BLD AUTO: 3.1 M/MM3 (ref 4–5.6)
SODIUM SERPL-SCNC: 158 MMOL/L (ref 136–145)
WBC # BLD AUTO: 23.1 K/MM3 (ref 4–10)

## 2020-12-07 RX ADMIN — VITAMIN D, TAB 1000IU (100/BT) SCH UNIT: 25 TAB at 10:44

## 2020-12-07 RX ADMIN — PIPERACILLIN SODIUM AND TAZOBACTAM SODIUM SCH MLS/HR: .25; 2 INJECTION, POWDER, LYOPHILIZED, FOR SOLUTION INTRAVENOUS at 09:03

## 2020-12-07 RX ADMIN — Medication SCH: at 22:28

## 2020-12-07 RX ADMIN — DOXYCYCLINE SCH MLS/HR: 100 INJECTION, POWDER, LYOPHILIZED, FOR SOLUTION INTRAVENOUS at 10:31

## 2020-12-07 RX ADMIN — Medication SCH MG: at 10:44

## 2020-12-07 RX ADMIN — ENOXAPARIN SODIUM SCH MG: 40 INJECTION SUBCUTANEOUS at 09:03

## 2020-12-07 RX ADMIN — OXYCODONE HYDROCHLORIDE AND ACETAMINOPHEN SCH MG: 500 TABLET ORAL at 10:44

## 2020-12-07 RX ADMIN — PIPERACILLIN SODIUM AND TAZOBACTAM SODIUM SCH MLS/HR: .25; 2 INJECTION, POWDER, LYOPHILIZED, FOR SOLUTION INTRAVENOUS at 02:54

## 2020-12-07 RX ADMIN — DOXYCYCLINE SCH MLS/HR: 100 INJECTION, POWDER, LYOPHILIZED, FOR SOLUTION INTRAVENOUS at 22:07

## 2020-12-07 RX ADMIN — PIPERACILLIN SODIUM AND TAZOBACTAM SODIUM SCH MLS/HR: .25; 2 INJECTION, POWDER, LYOPHILIZED, FOR SOLUTION INTRAVENOUS at 17:02

## 2020-12-07 RX ADMIN — OXYCODONE HYDROCHLORIDE AND ACETAMINOPHEN SCH: 500 TABLET ORAL at 22:28

## 2020-12-08 LAB
ALBUMIN SERPL-MCNC: 2 G/DL (ref 3.4–5)
ALBUMIN SERPL-MCNC: 2.1 G/DL (ref 3.4–5)
ALBUMIN SERPL-MCNC: 2.1 G/DL (ref 3.4–5)
ALP SERPL-CCNC: 85 U/L (ref 45–117)
ALP SERPL-CCNC: 90 U/L (ref 45–117)
ALP SERPL-CCNC: 92 U/L (ref 45–117)
ALT SERPL-CCNC: 15 U/L (ref 13–61)
ALT SERPL-CCNC: 17 U/L (ref 13–61)
ALT SERPL-CCNC: 17 U/L (ref 13–61)
ANION GAP SERPL CALC-SCNC: 6 MMOL/L (ref 8–16)
ANION GAP SERPL CALC-SCNC: 6 MMOL/L (ref 8–16)
ANION GAP SERPL CALC-SCNC: 7 MMOL/L (ref 8–16)
AST SERPL-CCNC: 18 U/L (ref 15–37)
AST SERPL-CCNC: 19 U/L (ref 15–37)
AST SERPL-CCNC: 20 U/L (ref 15–37)
BASOPHILS # BLD: 0.3 % (ref 0–2)
BILIRUB DIRECT SERPL-MCNC: 180 U/L (ref 87–246)
BILIRUB SERPL-MCNC: 0.4 MG/DL (ref 0.2–1)
BILIRUB SERPL-MCNC: 0.5 MG/DL (ref 0.2–1)
BILIRUB SERPL-MCNC: 0.6 MG/DL (ref 0.2–1)
BUN SERPL-MCNC: 39.6 MG/DL (ref 7–18)
BUN SERPL-MCNC: 41.5 MG/DL (ref 7–18)
BUN SERPL-MCNC: 46.5 MG/DL (ref 7–18)
CALCIUM SERPL-MCNC: 8.5 MG/DL (ref 8.5–10.1)
CALCIUM SERPL-MCNC: 8.7 MG/DL (ref 8.5–10.1)
CALCIUM SERPL-MCNC: 8.9 MG/DL (ref 8.5–10.1)
CHLORIDE SERPL-SCNC: 128 MMOL/L (ref 98–107)
CHLORIDE SERPL-SCNC: 129 MMOL/L (ref 98–107)
CHLORIDE SERPL-SCNC: 130 MMOL/L (ref 98–107)
CO2 SERPL-SCNC: 26 MMOL/L (ref 21–32)
CO2 SERPL-SCNC: 26 MMOL/L (ref 21–32)
CO2 SERPL-SCNC: 27 MMOL/L (ref 21–32)
CREAT SERPL-MCNC: 1.1 MG/DL (ref 0.55–1.3)
CREAT SERPL-MCNC: 1.1 MG/DL (ref 0.55–1.3)
CREAT SERPL-MCNC: 1.2 MG/DL (ref 0.55–1.3)
DEPRECATED RDW RBC AUTO: 14.3 % (ref 11.9–15.9)
EOSINOPHIL # BLD: 0 % (ref 0–4.5)
GLUCOSE SERPL-MCNC: 105 MG/DL (ref 74–106)
GLUCOSE SERPL-MCNC: 108 MG/DL (ref 74–106)
GLUCOSE SERPL-MCNC: 76 MG/DL (ref 74–106)
HCT VFR BLD CALC: 33.4 % (ref 35.4–49)
HGB BLD-MCNC: 10.3 GM/DL (ref 11.7–16.9)
LYMPHOCYTES # BLD: 11.3 % (ref 8–40)
MAGNESIUM SERPL-MCNC: 2.6 MG/DL (ref 1.8–2.4)
MCH RBC QN AUTO: 31.7 PG (ref 25.7–33.7)
MCHC RBC AUTO-ENTMCNC: 31 G/DL (ref 32–35.9)
MCV RBC: 102.1 FL (ref 80–96)
MONOCYTES # BLD AUTO: 5.3 % (ref 3.8–10.2)
NEUTROPHILS # BLD: 83.1 % (ref 42.8–82.8)
PLATELET # BLD AUTO: 351 K/MM3 (ref 134–434)
PMV BLD: 9.3 FL (ref 7.5–11.1)
PROT SERPL-MCNC: 6.2 G/DL (ref 6.4–8.2)
PROT SERPL-MCNC: 6.4 G/DL (ref 6.4–8.2)
PROT SERPL-MCNC: 6.7 G/DL (ref 6.4–8.2)
RBC # BLD AUTO: 3.27 M/MM3 (ref 4–5.6)
SODIUM SERPL-SCNC: 160 MMOL/L (ref 136–145)
SODIUM SERPL-SCNC: 162 MMOL/L (ref 136–145)
SODIUM SERPL-SCNC: 163 MMOL/L (ref 136–145)
WBC # BLD AUTO: 18.8 K/MM3 (ref 4–10)

## 2020-12-08 RX ADMIN — ENOXAPARIN SODIUM SCH MG: 40 INJECTION SUBCUTANEOUS at 08:59

## 2020-12-08 RX ADMIN — Medication SCH: at 21:54

## 2020-12-08 RX ADMIN — PIPERACILLIN SODIUM AND TAZOBACTAM SODIUM SCH MLS/HR: .25; 2 INJECTION, POWDER, LYOPHILIZED, FOR SOLUTION INTRAVENOUS at 08:59

## 2020-12-08 RX ADMIN — PIPERACILLIN SODIUM AND TAZOBACTAM SODIUM SCH MLS/HR: .25; 2 INJECTION, POWDER, LYOPHILIZED, FOR SOLUTION INTRAVENOUS at 01:31

## 2020-12-08 RX ADMIN — DOXYCYCLINE SCH MLS/HR: 100 INJECTION, POWDER, LYOPHILIZED, FOR SOLUTION INTRAVENOUS at 10:12

## 2020-12-08 RX ADMIN — DOXYCYCLINE SCH MLS/HR: 100 INJECTION, POWDER, LYOPHILIZED, FOR SOLUTION INTRAVENOUS at 21:54

## 2020-12-08 RX ADMIN — VITAMIN D, TAB 1000IU (100/BT) SCH: 25 TAB at 10:13

## 2020-12-08 RX ADMIN — OXYCODONE HYDROCHLORIDE AND ACETAMINOPHEN SCH: 500 TABLET ORAL at 10:13

## 2020-12-08 RX ADMIN — Medication SCH: at 10:13

## 2020-12-08 RX ADMIN — OXYCODONE HYDROCHLORIDE AND ACETAMINOPHEN SCH: 500 TABLET ORAL at 21:54

## 2020-12-08 RX ADMIN — PIPERACILLIN SODIUM AND TAZOBACTAM SODIUM SCH MLS/HR: .25; 2 INJECTION, POWDER, LYOPHILIZED, FOR SOLUTION INTRAVENOUS at 17:17

## 2020-12-08 RX ADMIN — POTASSIUM CHLORIDE SCH MLS/HR: 149 INJECTION, SOLUTION, CONCENTRATE INTRAVENOUS at 21:53

## 2020-12-09 LAB
ALBUMIN SERPL-MCNC: 2 G/DL (ref 3.4–5)
ALP SERPL-CCNC: 91 U/L (ref 45–117)
ALT SERPL-CCNC: 17 U/L (ref 13–61)
ANION GAP SERPL CALC-SCNC: 5 MMOL/L (ref 8–16)
AST SERPL-CCNC: 26 U/L (ref 15–37)
BASOPHILS # BLD: 0.2 % (ref 0–2)
BILIRUB DIRECT SERPL-MCNC: 270 U/L (ref 87–246)
BILIRUB SERPL-MCNC: 0.7 MG/DL (ref 0.2–1)
BUN SERPL-MCNC: 30.4 MG/DL (ref 7–18)
CALCIUM SERPL-MCNC: 8.3 MG/DL (ref 8.5–10.1)
CHLORIDE SERPL-SCNC: 126 MMOL/L (ref 98–107)
CO2 SERPL-SCNC: 26 MMOL/L (ref 21–32)
CREAT SERPL-MCNC: 0.9 MG/DL (ref 0.55–1.3)
DEPRECATED RDW RBC AUTO: 15 % (ref 11.9–15.9)
EOSINOPHIL # BLD: 0.1 % (ref 0–4.5)
GLUCOSE SERPL-MCNC: 111 MG/DL (ref 74–106)
HCT VFR BLD CALC: 36.2 % (ref 35.4–49)
HGB BLD-MCNC: 11.1 GM/DL (ref 11.7–16.9)
LYMPHOCYTES # BLD: 8.7 % (ref 8–40)
MAGNESIUM SERPL-MCNC: 2.2 MG/DL (ref 1.8–2.4)
MCH RBC QN AUTO: 31.5 PG (ref 25.7–33.7)
MCHC RBC AUTO-ENTMCNC: 30.6 G/DL (ref 32–35.9)
MCV RBC: 103 FL (ref 80–96)
MONOCYTES # BLD AUTO: 3.2 % (ref 3.8–10.2)
NEUTROPHILS # BLD: 87.8 % (ref 42.8–82.8)
PLATELET # BLD AUTO: 272 K/MM3 (ref 134–434)
PMV BLD: 9.9 FL (ref 7.5–11.1)
PROT SERPL-MCNC: 6.6 G/DL (ref 6.4–8.2)
RBC # BLD AUTO: 3.52 M/MM3 (ref 4–5.6)
SODIUM SERPL-SCNC: 157 MMOL/L (ref 136–145)
WBC # BLD AUTO: 17.3 K/MM3 (ref 4–10)

## 2020-12-09 RX ADMIN — POTASSIUM CHLORIDE SCH: 149 INJECTION, SOLUTION, CONCENTRATE INTRAVENOUS at 22:02

## 2020-12-09 RX ADMIN — DOXYCYCLINE SCH MLS/HR: 100 INJECTION, POWDER, LYOPHILIZED, FOR SOLUTION INTRAVENOUS at 22:01

## 2020-12-09 RX ADMIN — OXYCODONE HYDROCHLORIDE AND ACETAMINOPHEN SCH: 500 TABLET ORAL at 10:49

## 2020-12-09 RX ADMIN — Medication SCH: at 10:49

## 2020-12-09 RX ADMIN — Medication SCH: at 22:02

## 2020-12-09 RX ADMIN — PIPERACILLIN SODIUM AND TAZOBACTAM SODIUM SCH MLS/HR: .25; 2 INJECTION, POWDER, LYOPHILIZED, FOR SOLUTION INTRAVENOUS at 01:23

## 2020-12-09 RX ADMIN — VITAMIN D, TAB 1000IU (100/BT) SCH: 25 TAB at 10:49

## 2020-12-09 RX ADMIN — POTASSIUM CHLORIDE SCH: 149 INJECTION, SOLUTION, CONCENTRATE INTRAVENOUS at 05:46

## 2020-12-09 RX ADMIN — DOXYCYCLINE SCH MLS/HR: 100 INJECTION, POWDER, LYOPHILIZED, FOR SOLUTION INTRAVENOUS at 10:55

## 2020-12-09 RX ADMIN — ENOXAPARIN SODIUM SCH MG: 40 INJECTION SUBCUTANEOUS at 10:57

## 2020-12-09 RX ADMIN — PIPERACILLIN SODIUM AND TAZOBACTAM SODIUM SCH MLS/HR: .25; 2 INJECTION, POWDER, LYOPHILIZED, FOR SOLUTION INTRAVENOUS at 10:57

## 2020-12-09 RX ADMIN — PIPERACILLIN SODIUM AND TAZOBACTAM SODIUM SCH MLS/HR: .25; 2 INJECTION, POWDER, LYOPHILIZED, FOR SOLUTION INTRAVENOUS at 17:46

## 2020-12-09 RX ADMIN — POTASSIUM CHLORIDE SCH MLS/HR: 149 INJECTION, SOLUTION, CONCENTRATE INTRAVENOUS at 14:31

## 2020-12-09 RX ADMIN — OXYCODONE HYDROCHLORIDE AND ACETAMINOPHEN SCH: 500 TABLET ORAL at 22:02

## 2020-12-10 LAB
ALBUMIN SERPL-MCNC: 1.8 G/DL (ref 3.4–5)
ALP SERPL-CCNC: 77 U/L (ref 45–117)
ALT SERPL-CCNC: 15 U/L (ref 13–61)
ANION GAP SERPL CALC-SCNC: 7 MMOL/L (ref 8–16)
AST SERPL-CCNC: 23 U/L (ref 15–37)
BASOPHILS # BLD: 0.4 % (ref 0–2)
BILIRUB DIRECT SERPL-MCNC: 244 U/L (ref 87–246)
BILIRUB SERPL-MCNC: 0.6 MG/DL (ref 0.2–1)
BUN SERPL-MCNC: 23.5 MG/DL (ref 7–18)
CALCIUM SERPL-MCNC: 7.9 MG/DL (ref 8.5–10.1)
CHLORIDE SERPL-SCNC: 121 MMOL/L (ref 98–107)
CO2 SERPL-SCNC: 24 MMOL/L (ref 21–32)
CREAT SERPL-MCNC: 0.8 MG/DL (ref 0.55–1.3)
DEPRECATED RDW RBC AUTO: 14.6 % (ref 11.9–15.9)
EOSINOPHIL # BLD: 0.3 % (ref 0–4.5)
GLUCOSE SERPL-MCNC: 104 MG/DL (ref 74–106)
HCT VFR BLD CALC: 36.8 % (ref 35.4–49)
HGB BLD-MCNC: 11.3 GM/DL (ref 11.7–16.9)
LYMPHOCYTES # BLD: 10.4 % (ref 8–40)
MAGNESIUM SERPL-MCNC: 2 MG/DL (ref 1.8–2.4)
MCH RBC QN AUTO: 31.6 PG (ref 25.7–33.7)
MCHC RBC AUTO-ENTMCNC: 30.8 G/DL (ref 32–35.9)
MCV RBC: 102.7 FL (ref 80–96)
MONOCYTES # BLD AUTO: 3.9 % (ref 3.8–10.2)
NEUTROPHILS # BLD: 85 % (ref 42.8–82.8)
PLATELET # BLD AUTO: 245 K/MM3 (ref 134–434)
PMV BLD: 9.8 FL (ref 7.5–11.1)
PROT SERPL-MCNC: 5.9 G/DL (ref 6.4–8.2)
RBC # BLD AUTO: 3.58 M/MM3 (ref 4–5.6)
SODIUM SERPL-SCNC: 152 MMOL/L (ref 136–145)
WBC # BLD AUTO: 14.7 K/MM3 (ref 4–10)

## 2020-12-10 RX ADMIN — ENOXAPARIN SODIUM SCH MG: 40 INJECTION SUBCUTANEOUS at 10:01

## 2020-12-10 RX ADMIN — POTASSIUM CHLORIDE SCH MLS/HR: 149 INJECTION, SOLUTION, CONCENTRATE INTRAVENOUS at 20:42

## 2020-12-10 RX ADMIN — OXYCODONE HYDROCHLORIDE AND ACETAMINOPHEN SCH: 500 TABLET ORAL at 10:48

## 2020-12-10 RX ADMIN — PIPERACILLIN SODIUM AND TAZOBACTAM SODIUM SCH MLS/HR: .25; 2 INJECTION, POWDER, LYOPHILIZED, FOR SOLUTION INTRAVENOUS at 17:01

## 2020-12-10 RX ADMIN — OXYCODONE HYDROCHLORIDE AND ACETAMINOPHEN SCH: 500 TABLET ORAL at 22:42

## 2020-12-10 RX ADMIN — DOXYCYCLINE SCH MLS/HR: 100 INJECTION, POWDER, LYOPHILIZED, FOR SOLUTION INTRAVENOUS at 10:01

## 2020-12-10 RX ADMIN — DOXYCYCLINE SCH MLS/HR: 100 INJECTION, POWDER, LYOPHILIZED, FOR SOLUTION INTRAVENOUS at 22:42

## 2020-12-10 RX ADMIN — PIPERACILLIN SODIUM AND TAZOBACTAM SODIUM SCH MLS/HR: .25; 2 INJECTION, POWDER, LYOPHILIZED, FOR SOLUTION INTRAVENOUS at 01:26

## 2020-12-10 RX ADMIN — VITAMIN D, TAB 1000IU (100/BT) SCH: 25 TAB at 10:48

## 2020-12-10 RX ADMIN — PIPERACILLIN SODIUM AND TAZOBACTAM SODIUM SCH MLS/HR: .25; 2 INJECTION, POWDER, LYOPHILIZED, FOR SOLUTION INTRAVENOUS at 10:01

## 2020-12-10 RX ADMIN — POTASSIUM CHLORIDE SCH MLS/HR: 149 INJECTION, SOLUTION, CONCENTRATE INTRAVENOUS at 17:01

## 2020-12-10 RX ADMIN — Medication SCH: at 22:42

## 2020-12-10 RX ADMIN — POTASSIUM CHLORIDE SCH MLS/HR: 149 INJECTION, SOLUTION, CONCENTRATE INTRAVENOUS at 06:44

## 2020-12-10 RX ADMIN — Medication SCH: at 10:48

## 2020-12-11 LAB
ALBUMIN SERPL-MCNC: 1.7 G/DL (ref 3.4–5)
ALP SERPL-CCNC: 83 U/L (ref 45–117)
ALT SERPL-CCNC: 14 U/L (ref 13–61)
ANION GAP SERPL CALC-SCNC: 9 MMOL/L (ref 8–16)
AST SERPL-CCNC: 31 U/L (ref 15–37)
BASOPHILS # BLD: 0.4 % (ref 0–2)
BILIRUB DIRECT SERPL-MCNC: 395 U/L (ref 87–246)
BILIRUB SERPL-MCNC: 0.8 MG/DL (ref 0.2–1)
BUN SERPL-MCNC: 29.6 MG/DL (ref 7–18)
CALCIUM SERPL-MCNC: 7.8 MG/DL (ref 8.5–10.1)
CHLORIDE SERPL-SCNC: 117 MMOL/L (ref 98–107)
CO2 SERPL-SCNC: 20 MMOL/L (ref 21–32)
CREAT SERPL-MCNC: 0.9 MG/DL (ref 0.55–1.3)
DEPRECATED RDW RBC AUTO: 13.9 % (ref 11.9–15.9)
EOSINOPHIL # BLD: 0.7 % (ref 0–4.5)
GLUCOSE SERPL-MCNC: 87 MG/DL (ref 74–106)
HCT VFR BLD CALC: 32.1 % (ref 35.4–49)
HGB BLD-MCNC: 9.9 GM/DL (ref 11.7–16.9)
LYMPHOCYTES # BLD: 9.6 % (ref 8–40)
MAGNESIUM SERPL-MCNC: 1.9 MG/DL (ref 1.8–2.4)
MCH RBC QN AUTO: 30.9 PG (ref 25.7–33.7)
MCHC RBC AUTO-ENTMCNC: 30.9 G/DL (ref 32–35.9)
MCV RBC: 100.1 FL (ref 80–96)
MONOCYTES # BLD AUTO: 3.9 % (ref 3.8–10.2)
NEUTROPHILS # BLD: 85.4 % (ref 42.8–82.8)
PLATELET # BLD AUTO: 265 K/MM3 (ref 134–434)
PMV BLD: 9.6 FL (ref 7.5–11.1)
PROT SERPL-MCNC: 6 G/DL (ref 6.4–8.2)
RBC # BLD AUTO: 3.21 M/MM3 (ref 4–5.6)
SODIUM SERPL-SCNC: 146 MMOL/L (ref 136–145)
WBC # BLD AUTO: 14.3 K/MM3 (ref 4–10)

## 2020-12-11 RX ADMIN — DOXYCYCLINE SCH MLS/HR: 100 INJECTION, POWDER, LYOPHILIZED, FOR SOLUTION INTRAVENOUS at 10:28

## 2020-12-11 RX ADMIN — POTASSIUM CHLORIDE SCH: 149 INJECTION, SOLUTION, CONCENTRATE INTRAVENOUS at 10:32

## 2020-12-11 RX ADMIN — VITAMIN D, TAB 1000IU (100/BT) SCH: 25 TAB at 10:27

## 2020-12-11 RX ADMIN — Medication SCH: at 10:28

## 2020-12-11 RX ADMIN — PIPERACILLIN SODIUM AND TAZOBACTAM SODIUM SCH MLS/HR: .25; 2 INJECTION, POWDER, LYOPHILIZED, FOR SOLUTION INTRAVENOUS at 10:27

## 2020-12-11 RX ADMIN — DOXYCYCLINE SCH MLS/HR: 100 INJECTION, POWDER, LYOPHILIZED, FOR SOLUTION INTRAVENOUS at 22:02

## 2020-12-11 RX ADMIN — POTASSIUM CHLORIDE SCH MLS/HR: 149 INJECTION, SOLUTION, CONCENTRATE INTRAVENOUS at 20:01

## 2020-12-11 RX ADMIN — ENOXAPARIN SODIUM SCH MG: 40 INJECTION SUBCUTANEOUS at 10:27

## 2020-12-11 RX ADMIN — OXYCODONE HYDROCHLORIDE AND ACETAMINOPHEN SCH: 500 TABLET ORAL at 10:27

## 2020-12-11 RX ADMIN — PIPERACILLIN SODIUM AND TAZOBACTAM SODIUM SCH MLS/HR: .25; 2 INJECTION, POWDER, LYOPHILIZED, FOR SOLUTION INTRAVENOUS at 01:27

## 2020-12-11 RX ADMIN — PIPERACILLIN SODIUM AND TAZOBACTAM SODIUM SCH MLS/HR: .25; 2 INJECTION, POWDER, LYOPHILIZED, FOR SOLUTION INTRAVENOUS at 18:49

## 2020-12-12 LAB
ALBUMIN SERPL-MCNC: 1.8 G/DL (ref 3.4–5)
ALP SERPL-CCNC: 133 U/L (ref 45–117)
ALT SERPL-CCNC: 17 U/L (ref 13–61)
ANION GAP SERPL CALC-SCNC: 8 MMOL/L (ref 8–16)
AST SERPL-CCNC: 29 U/L (ref 15–37)
BASOPHILS # BLD: 0.4 % (ref 0–2)
BILIRUB SERPL-MCNC: 0.8 MG/DL (ref 0.2–1)
BUN SERPL-MCNC: 32.7 MG/DL (ref 7–18)
CALCIUM SERPL-MCNC: 8 MG/DL (ref 8.5–10.1)
CHLORIDE SERPL-SCNC: 115 MMOL/L (ref 98–107)
CO2 SERPL-SCNC: 21 MMOL/L (ref 21–32)
CREAT SERPL-MCNC: 0.8 MG/DL (ref 0.55–1.3)
DEPRECATED RDW RBC AUTO: 14.4 % (ref 11.9–15.9)
EOSINOPHIL # BLD: 0.2 % (ref 0–4.5)
GLUCOSE SERPL-MCNC: 74 MG/DL (ref 74–106)
HCT VFR BLD CALC: 34.8 % (ref 35.4–49)
HGB BLD-MCNC: 11.1 GM/DL (ref 11.7–16.9)
LYMPHOCYTES # BLD: 8.2 % (ref 8–40)
MAGNESIUM SERPL-MCNC: 1.8 MG/DL (ref 1.8–2.4)
MCH RBC QN AUTO: 32.1 PG (ref 25.7–33.7)
MCHC RBC AUTO-ENTMCNC: 32 G/DL (ref 32–35.9)
MCV RBC: 100.3 FL (ref 80–96)
MONOCYTES # BLD AUTO: 4.4 % (ref 3.8–10.2)
NEUTROPHILS # BLD: 86.8 % (ref 42.8–82.8)
PLATELET # BLD AUTO: 302 K/MM3 (ref 134–434)
PMV BLD: 9.4 FL (ref 7.5–11.1)
PROT SERPL-MCNC: 6.6 G/DL (ref 6.4–8.2)
RBC # BLD AUTO: 3.47 M/MM3 (ref 4–5.6)
SODIUM SERPL-SCNC: 145 MMOL/L (ref 136–145)
WBC # BLD AUTO: 14.5 K/MM3 (ref 4–10)

## 2020-12-12 RX ADMIN — DOXYCYCLINE SCH MLS/HR: 100 INJECTION, POWDER, LYOPHILIZED, FOR SOLUTION INTRAVENOUS at 09:00

## 2020-12-12 RX ADMIN — ENOXAPARIN SODIUM SCH MG: 40 INJECTION SUBCUTANEOUS at 08:59

## 2020-12-12 RX ADMIN — DOXYCYCLINE SCH MLS/HR: 100 INJECTION, POWDER, LYOPHILIZED, FOR SOLUTION INTRAVENOUS at 23:16

## 2020-12-12 RX ADMIN — PIPERACILLIN SODIUM AND TAZOBACTAM SODIUM SCH: .25; 2 INJECTION, POWDER, LYOPHILIZED, FOR SOLUTION INTRAVENOUS at 18:32

## 2020-12-12 RX ADMIN — POTASSIUM CHLORIDE SCH MLS/HR: 149 INJECTION, SOLUTION, CONCENTRATE INTRAVENOUS at 12:43

## 2020-12-12 RX ADMIN — PIPERACILLIN SODIUM AND TAZOBACTAM SODIUM SCH MLS/HR: .25; 2 INJECTION, POWDER, LYOPHILIZED, FOR SOLUTION INTRAVENOUS at 09:00

## 2020-12-12 RX ADMIN — POTASSIUM CHLORIDE SCH MLS/HR: 149 INJECTION, SOLUTION, CONCENTRATE INTRAVENOUS at 23:16

## 2020-12-12 RX ADMIN — PIPERACILLIN SODIUM AND TAZOBACTAM SODIUM SCH MLS/HR: .25; 2 INJECTION, POWDER, LYOPHILIZED, FOR SOLUTION INTRAVENOUS at 01:30

## 2020-12-13 LAB
ALBUMIN SERPL-MCNC: 1.7 G/DL (ref 3.4–5)
ALP SERPL-CCNC: 122 U/L (ref 45–117)
ALT SERPL-CCNC: 16 U/L (ref 13–61)
ANION GAP SERPL CALC-SCNC: 7 MMOL/L (ref 8–16)
AST SERPL-CCNC: 27 U/L (ref 15–37)
BASOPHILS # BLD: 0.5 % (ref 0–2)
BILIRUB SERPL-MCNC: 1.3 MG/DL (ref 0.2–1)
BUN SERPL-MCNC: 35.8 MG/DL (ref 7–18)
CALCIUM SERPL-MCNC: 8 MG/DL (ref 8.5–10.1)
CHLORIDE SERPL-SCNC: 114 MMOL/L (ref 98–107)
CO2 SERPL-SCNC: 21 MMOL/L (ref 21–32)
CREAT SERPL-MCNC: 0.8 MG/DL (ref 0.55–1.3)
DEPRECATED RDW RBC AUTO: 14.1 % (ref 11.9–15.9)
EOSINOPHIL # BLD: 0.7 % (ref 0–4.5)
GLUCOSE SERPL-MCNC: 100 MG/DL (ref 74–106)
HCT VFR BLD CALC: 31.1 % (ref 35.4–49)
HGB BLD-MCNC: 10.1 GM/DL (ref 11.7–16.9)
LYMPHOCYTES # BLD: 11.1 % (ref 8–40)
MCH RBC QN AUTO: 32.4 PG (ref 25.7–33.7)
MCHC RBC AUTO-ENTMCNC: 32.4 G/DL (ref 32–35.9)
MCV RBC: 99.9 FL (ref 80–96)
MONOCYTES # BLD AUTO: 5.3 % (ref 3.8–10.2)
NEUTROPHILS # BLD: 82.4 % (ref 42.8–82.8)
PLATELET # BLD AUTO: 262 K/MM3 (ref 134–434)
PMV BLD: 9.2 FL (ref 7.5–11.1)
PROT SERPL-MCNC: 6.2 G/DL (ref 6.4–8.2)
RBC # BLD AUTO: 3.11 M/MM3 (ref 4–5.6)
SODIUM SERPL-SCNC: 142 MMOL/L (ref 136–145)
WBC # BLD AUTO: 10.9 K/MM3 (ref 4–10)

## 2020-12-13 RX ADMIN — DOXYCYCLINE SCH MLS/HR: 100 INJECTION, POWDER, LYOPHILIZED, FOR SOLUTION INTRAVENOUS at 09:08

## 2020-12-13 RX ADMIN — POTASSIUM CHLORIDE SCH: 149 INJECTION, SOLUTION, CONCENTRATE INTRAVENOUS at 13:25

## 2020-12-13 RX ADMIN — DOXYCYCLINE SCH MLS/HR: 100 INJECTION, POWDER, LYOPHILIZED, FOR SOLUTION INTRAVENOUS at 21:00

## 2020-12-13 RX ADMIN — PIPERACILLIN SODIUM AND TAZOBACTAM SODIUM SCH MLS/HR: .25; 2 INJECTION, POWDER, LYOPHILIZED, FOR SOLUTION INTRAVENOUS at 18:10

## 2020-12-13 RX ADMIN — PIPERACILLIN SODIUM AND TAZOBACTAM SODIUM SCH MLS/HR: .25; 2 INJECTION, POWDER, LYOPHILIZED, FOR SOLUTION INTRAVENOUS at 02:02

## 2020-12-13 RX ADMIN — ENOXAPARIN SODIUM SCH MG: 40 INJECTION SUBCUTANEOUS at 09:08

## 2020-12-13 RX ADMIN — POTASSIUM CHLORIDE SCH MLS/HR: 149 INJECTION, SOLUTION, CONCENTRATE INTRAVENOUS at 21:01

## 2020-12-13 RX ADMIN — PIPERACILLIN SODIUM AND TAZOBACTAM SODIUM SCH MLS/HR: .25; 2 INJECTION, POWDER, LYOPHILIZED, FOR SOLUTION INTRAVENOUS at 09:08

## 2020-12-14 LAB
ALBUMIN SERPL-MCNC: 1.6 G/DL (ref 3.4–5)
ALP SERPL-CCNC: 133 U/L (ref 45–117)
ALT SERPL-CCNC: 19 U/L (ref 13–61)
ANION GAP SERPL CALC-SCNC: 6 MMOL/L (ref 8–16)
AST SERPL-CCNC: 27 U/L (ref 15–37)
BASOPHILS # BLD: 0.7 % (ref 0–2)
BILIRUB SERPL-MCNC: 0.9 MG/DL (ref 0.2–1)
BUN SERPL-MCNC: 32.1 MG/DL (ref 7–18)
CALCIUM SERPL-MCNC: 7.6 MG/DL (ref 8.5–10.1)
CHLORIDE SERPL-SCNC: 115 MMOL/L (ref 98–107)
CO2 SERPL-SCNC: 23 MMOL/L (ref 21–32)
CREAT SERPL-MCNC: 0.7 MG/DL (ref 0.55–1.3)
DEPRECATED RDW RBC AUTO: 14.1 % (ref 11.9–15.9)
EOSINOPHIL # BLD: 0.9 % (ref 0–4.5)
GLUCOSE SERPL-MCNC: 102 MG/DL (ref 74–106)
HCT VFR BLD CALC: 29.6 % (ref 35.4–49)
HGB BLD-MCNC: 9.6 GM/DL (ref 11.7–16.9)
LYMPHOCYTES # BLD: 12.4 % (ref 8–40)
MAGNESIUM SERPL-MCNC: 1.7 MG/DL (ref 1.8–2.4)
MCH RBC QN AUTO: 32.6 PG (ref 25.7–33.7)
MCHC RBC AUTO-ENTMCNC: 32.4 G/DL (ref 32–35.9)
MCV RBC: 100.7 FL (ref 80–96)
MONOCYTES # BLD AUTO: 4.9 % (ref 3.8–10.2)
NEUTROPHILS # BLD: 81.1 % (ref 42.8–82.8)
PLATELET # BLD AUTO: 228 K/MM3 (ref 134–434)
PMV BLD: 9.6 FL (ref 7.5–11.1)
PROT SERPL-MCNC: 6 G/DL (ref 6.4–8.2)
RBC # BLD AUTO: 2.94 M/MM3 (ref 4–5.6)
SODIUM SERPL-SCNC: 144 MMOL/L (ref 136–145)
WBC # BLD AUTO: 10.4 K/MM3 (ref 4–10)

## 2020-12-14 RX ADMIN — DOXYCYCLINE SCH MLS/HR: 100 INJECTION, POWDER, LYOPHILIZED, FOR SOLUTION INTRAVENOUS at 09:40

## 2020-12-14 RX ADMIN — PIPERACILLIN SODIUM AND TAZOBACTAM SODIUM SCH MLS/HR: .25; 2 INJECTION, POWDER, LYOPHILIZED, FOR SOLUTION INTRAVENOUS at 17:51

## 2020-12-14 RX ADMIN — POTASSIUM CHLORIDE SCH MLS/HR: 149 INJECTION, SOLUTION, CONCENTRATE INTRAVENOUS at 13:45

## 2020-12-14 RX ADMIN — DOXYCYCLINE SCH MLS/HR: 100 INJECTION, POWDER, LYOPHILIZED, FOR SOLUTION INTRAVENOUS at 21:54

## 2020-12-14 RX ADMIN — PIPERACILLIN SODIUM AND TAZOBACTAM SODIUM SCH MLS/HR: .25; 2 INJECTION, POWDER, LYOPHILIZED, FOR SOLUTION INTRAVENOUS at 09:40

## 2020-12-14 RX ADMIN — PIPERACILLIN SODIUM AND TAZOBACTAM SODIUM SCH MLS/HR: .25; 2 INJECTION, POWDER, LYOPHILIZED, FOR SOLUTION INTRAVENOUS at 01:11

## 2020-12-14 RX ADMIN — POTASSIUM CHLORIDE SCH: 149 INJECTION, SOLUTION, CONCENTRATE INTRAVENOUS at 00:11

## 2020-12-14 RX ADMIN — PANTOPRAZOLE SODIUM SCH MLS/HR: 40 INJECTION, POWDER, FOR SOLUTION INTRAVENOUS at 09:40

## 2020-12-14 RX ADMIN — ENOXAPARIN SODIUM SCH MG: 40 INJECTION SUBCUTANEOUS at 09:41

## 2020-12-15 LAB
ALBUMIN SERPL-MCNC: 1.6 G/DL (ref 3.4–5)
ALP SERPL-CCNC: 132 U/L (ref 45–117)
ALT SERPL-CCNC: 17 U/L (ref 13–61)
ANION GAP SERPL CALC-SCNC: 8 MMOL/L (ref 8–16)
AST SERPL-CCNC: 25 U/L (ref 15–37)
BASOPHILS # BLD: 0.7 % (ref 0–2)
BILIRUB SERPL-MCNC: 0.9 MG/DL (ref 0.2–1)
BUN SERPL-MCNC: 33.1 MG/DL (ref 7–18)
CALCIUM SERPL-MCNC: 7.6 MG/DL (ref 8.5–10.1)
CHLORIDE SERPL-SCNC: 114 MMOL/L (ref 98–107)
CHOLEST SERPL-MCNC: 92 MG/DL (ref 50–200)
CO2 SERPL-SCNC: 19 MMOL/L (ref 21–32)
CREAT SERPL-MCNC: 0.6 MG/DL (ref 0.55–1.3)
DEPRECATED RDW RBC AUTO: 13.9 % (ref 11.9–15.9)
EOSINOPHIL # BLD: 1.2 % (ref 0–4.5)
GLUCOSE SERPL-MCNC: 89 MG/DL (ref 74–106)
HCT VFR BLD CALC: 29.8 % (ref 35.4–49)
HDLC SERPL-MCNC: 20 MG/DL (ref 40–60)
HGB BLD-MCNC: 9.8 GM/DL (ref 11.7–16.9)
LDLC SERPL CALC-MCNC: 67 MG/DL (ref 5–100)
LYMPHOCYTES # BLD: 14 % (ref 8–40)
MAGNESIUM SERPL-MCNC: 2 MG/DL (ref 1.8–2.4)
MCH RBC QN AUTO: 32.2 PG (ref 25.7–33.7)
MCHC RBC AUTO-ENTMCNC: 32.8 G/DL (ref 32–35.9)
MCV RBC: 98.1 FL (ref 80–96)
MONOCYTES # BLD AUTO: 4.9 % (ref 3.8–10.2)
NEUTROPHILS # BLD: 79.2 % (ref 42.8–82.8)
PLATELET # BLD AUTO: 237 K/MM3 (ref 134–434)
PMV BLD: 8.9 FL (ref 7.5–11.1)
PROT SERPL-MCNC: 6 G/DL (ref 6.4–8.2)
RBC # BLD AUTO: 3.04 M/MM3 (ref 4–5.6)
SODIUM SERPL-SCNC: 142 MMOL/L (ref 136–145)
TRIGL SERPL-MCNC: 95 MG/DL (ref 0–150)
WBC # BLD AUTO: 10 K/MM3 (ref 4–10)

## 2020-12-15 RX ADMIN — PANTOPRAZOLE SODIUM SCH MLS/HR: 40 INJECTION, POWDER, FOR SOLUTION INTRAVENOUS at 09:19

## 2020-12-15 RX ADMIN — PIPERACILLIN SODIUM AND TAZOBACTAM SODIUM SCH MLS/HR: .25; 2 INJECTION, POWDER, LYOPHILIZED, FOR SOLUTION INTRAVENOUS at 01:19

## 2020-12-15 RX ADMIN — ENOXAPARIN SODIUM SCH MG: 40 INJECTION SUBCUTANEOUS at 09:19

## 2020-12-15 RX ADMIN — ASCORBIC ACID, VITAMIN A PALMITATE, CHOLECALCIFEROL, THIAMINE HYDROCHLORIDE, RIBOFLAVIN-5 PHOSPHATE SODIUM, PYRIDOXINE HYDROCHLORIDE, NIACINAMIDE, DEXPANTHENOL, ALPHA-TOCOPHEROL ACETATE, VITAMIN K1, FOLIC ACID, BIOTIN, CYANOCOBALAMIN SCH ML: 200; 3300; 200; 6; 3.6; 6; 40; 15; 10; 150; 600; 60; 5 INJECTION, SOLUTION INTRAVENOUS at 15:16

## 2020-12-15 RX ADMIN — POTASSIUM CHLORIDE SCH MLS/HR: 149 INJECTION, SOLUTION, CONCENTRATE INTRAVENOUS at 15:16

## 2020-12-15 RX ADMIN — PIPERACILLIN SODIUM AND TAZOBACTAM SODIUM SCH MLS/HR: .25; 2 INJECTION, POWDER, LYOPHILIZED, FOR SOLUTION INTRAVENOUS at 09:35

## 2020-12-15 RX ADMIN — PIPERACILLIN SODIUM AND TAZOBACTAM SODIUM SCH MLS/HR: .25; 2 INJECTION, POWDER, LYOPHILIZED, FOR SOLUTION INTRAVENOUS at 17:44

## 2020-12-15 RX ADMIN — DOXYCYCLINE SCH MLS/HR: 100 INJECTION, POWDER, LYOPHILIZED, FOR SOLUTION INTRAVENOUS at 21:29

## 2020-12-15 RX ADMIN — POTASSIUM CHLORIDE SCH MLS/HR: 149 INJECTION, SOLUTION, CONCENTRATE INTRAVENOUS at 01:20

## 2020-12-15 RX ADMIN — OLIVE OIL AND SOYBEAN OIL SCH MLS/HR: 16; 4 INJECTION, EMULSION INTRAVENOUS at 21:28

## 2020-12-15 RX ADMIN — POTASSIUM CHLORIDE SCH: 149 INJECTION, SOLUTION, CONCENTRATE INTRAVENOUS at 05:35

## 2020-12-15 RX ADMIN — DOXYCYCLINE SCH MLS/HR: 100 INJECTION, POWDER, LYOPHILIZED, FOR SOLUTION INTRAVENOUS at 10:35

## 2020-12-16 LAB
ALBUMIN SERPL-MCNC: 1.8 G/DL (ref 3.4–5)
ALP SERPL-CCNC: 142 U/L (ref 45–117)
ALT SERPL-CCNC: 17 U/L (ref 13–61)
ANION GAP SERPL CALC-SCNC: 9 MMOL/L (ref 8–16)
AST SERPL-CCNC: 26 U/L (ref 15–37)
BASOPHILS # BLD: 0.8 % (ref 0–2)
BILIRUB SERPL-MCNC: 1.4 MG/DL (ref 0.2–1)
BUN SERPL-MCNC: 33 MG/DL (ref 7–18)
CALCIUM SERPL-MCNC: 8 MG/DL (ref 8.5–10.1)
CHLORIDE SERPL-SCNC: 114 MMOL/L (ref 98–107)
CO2 SERPL-SCNC: 18 MMOL/L (ref 21–32)
CREAT SERPL-MCNC: 0.6 MG/DL (ref 0.55–1.3)
DEPRECATED RDW RBC AUTO: 14.1 % (ref 11.9–15.9)
EOSINOPHIL # BLD: 0.8 % (ref 0–4.5)
GLUCOSE SERPL-MCNC: 81 MG/DL (ref 74–106)
HCT VFR BLD CALC: 32.6 % (ref 35.4–49)
HGB BLD-MCNC: 10.7 GM/DL (ref 11.7–16.9)
LYMPHOCYTES # BLD: 16.4 % (ref 8–40)
MAGNESIUM SERPL-MCNC: 2 MG/DL (ref 1.8–2.4)
MCH RBC QN AUTO: 32.3 PG (ref 25.7–33.7)
MCHC RBC AUTO-ENTMCNC: 32.7 G/DL (ref 32–35.9)
MCV RBC: 98.7 FL (ref 80–96)
MONOCYTES # BLD AUTO: 4.2 % (ref 3.8–10.2)
NEUTROPHILS # BLD: 77.8 % (ref 42.8–82.8)
PLATELET # BLD AUTO: 203 K/MM3 (ref 134–434)
PMV BLD: 9.4 FL (ref 7.5–11.1)
PROT SERPL-MCNC: 6.4 G/DL (ref 6.4–8.2)
RBC # BLD AUTO: 3.3 M/MM3 (ref 4–5.6)
SODIUM SERPL-SCNC: 142 MMOL/L (ref 136–145)
WBC # BLD AUTO: 9.3 K/MM3 (ref 4–10)

## 2020-12-16 RX ADMIN — ASCORBIC ACID, VITAMIN A PALMITATE, CHOLECALCIFEROL, THIAMINE HYDROCHLORIDE, RIBOFLAVIN-5 PHOSPHATE SODIUM, PYRIDOXINE HYDROCHLORIDE, NIACINAMIDE, DEXPANTHENOL, ALPHA-TOCOPHEROL ACETATE, VITAMIN K1, FOLIC ACID, BIOTIN, CYANOCOBALAMIN SCH: 200; 3300; 200; 6; 3.6; 6; 40; 15; 10; 150; 600; 60; 5 INJECTION, SOLUTION INTRAVENOUS at 16:13

## 2020-12-16 RX ADMIN — DOXYCYCLINE SCH MLS/HR: 100 INJECTION, POWDER, LYOPHILIZED, FOR SOLUTION INTRAVENOUS at 22:12

## 2020-12-16 RX ADMIN — POTASSIUM CHLORIDE SCH: 149 INJECTION, SOLUTION, CONCENTRATE INTRAVENOUS at 16:12

## 2020-12-16 RX ADMIN — ENOXAPARIN SODIUM SCH MG: 40 INJECTION SUBCUTANEOUS at 09:56

## 2020-12-16 RX ADMIN — PIPERACILLIN SODIUM AND TAZOBACTAM SODIUM SCH MLS/HR: .25; 2 INJECTION, POWDER, LYOPHILIZED, FOR SOLUTION INTRAVENOUS at 17:10

## 2020-12-16 RX ADMIN — DOXYCYCLINE SCH MLS/HR: 100 INJECTION, POWDER, LYOPHILIZED, FOR SOLUTION INTRAVENOUS at 11:37

## 2020-12-16 RX ADMIN — PIPERACILLIN SODIUM AND TAZOBACTAM SODIUM SCH MLS/HR: .25; 2 INJECTION, POWDER, LYOPHILIZED, FOR SOLUTION INTRAVENOUS at 09:56

## 2020-12-16 RX ADMIN — OLIVE OIL AND SOYBEAN OIL SCH MLS/HR: 16; 4 INJECTION, EMULSION INTRAVENOUS at 22:12

## 2020-12-16 RX ADMIN — POTASSIUM CHLORIDE SCH: 149 INJECTION, SOLUTION, CONCENTRATE INTRAVENOUS at 03:44

## 2020-12-16 RX ADMIN — PANTOPRAZOLE SODIUM SCH MLS/HR: 40 INJECTION, POWDER, FOR SOLUTION INTRAVENOUS at 10:10

## 2020-12-16 RX ADMIN — PIPERACILLIN SODIUM AND TAZOBACTAM SODIUM SCH MLS/HR: .25; 2 INJECTION, POWDER, LYOPHILIZED, FOR SOLUTION INTRAVENOUS at 01:37

## 2020-12-16 RX ADMIN — POTASSIUM CHLORIDE SCH MLS/HR: 149 INJECTION, SOLUTION, CONCENTRATE INTRAVENOUS at 13:04

## 2020-12-17 LAB
ALBUMIN SERPL-MCNC: 1.7 G/DL (ref 3.4–5)
ALP SERPL-CCNC: 154 U/L (ref 45–117)
ALT SERPL-CCNC: 21 U/L (ref 13–61)
ANION GAP SERPL CALC-SCNC: 8 MMOL/L (ref 8–16)
AST SERPL-CCNC: 30 U/L (ref 15–37)
BASOPHILS # BLD: 1.3 % (ref 0–2)
BILIRUB SERPL-MCNC: 0.5 MG/DL (ref 0.2–1)
BUN SERPL-MCNC: 32.7 MG/DL (ref 7–18)
CALCIUM SERPL-MCNC: 8.1 MG/DL (ref 8.5–10.1)
CHLORIDE SERPL-SCNC: 115 MMOL/L (ref 98–107)
CO2 SERPL-SCNC: 19 MMOL/L (ref 21–32)
CREAT SERPL-MCNC: 0.7 MG/DL (ref 0.55–1.3)
DEPRECATED RDW RBC AUTO: 14.1 % (ref 11.9–15.9)
EOSINOPHIL # BLD: 1.5 % (ref 0–4.5)
GLUCOSE SERPL-MCNC: 95 MG/DL (ref 74–106)
HCT VFR BLD CALC: 31.5 % (ref 35.4–49)
HGB BLD-MCNC: 10.4 GM/DL (ref 11.7–16.9)
LYMPHOCYTES # BLD: 16.3 % (ref 8–40)
MAGNESIUM SERPL-MCNC: 1.8 MG/DL (ref 1.8–2.4)
MCH RBC QN AUTO: 32.9 PG (ref 25.7–33.7)
MCHC RBC AUTO-ENTMCNC: 33.1 G/DL (ref 32–35.9)
MCV RBC: 99.4 FL (ref 80–96)
MONOCYTES # BLD AUTO: 4.7 % (ref 3.8–10.2)
NEUTROPHILS # BLD: 76.2 % (ref 42.8–82.8)
PLATELET # BLD AUTO: 198 K/MM3 (ref 134–434)
PMV BLD: 9.8 FL (ref 7.5–11.1)
PROT SERPL-MCNC: 6.2 G/DL (ref 6.4–8.2)
RBC # BLD AUTO: 3.17 M/MM3 (ref 4–5.6)
SODIUM SERPL-SCNC: 141 MMOL/L (ref 136–145)
WBC # BLD AUTO: 9.7 K/MM3 (ref 4–10)

## 2020-12-17 RX ADMIN — ENOXAPARIN SODIUM SCH MG: 40 INJECTION SUBCUTANEOUS at 09:03

## 2020-12-17 RX ADMIN — PANTOPRAZOLE SODIUM SCH MLS/HR: 40 INJECTION, POWDER, FOR SOLUTION INTRAVENOUS at 09:39

## 2020-12-17 RX ADMIN — ASCORBIC ACID, VITAMIN A PALMITATE, CHOLECALCIFEROL, THIAMINE HYDROCHLORIDE, RIBOFLAVIN-5 PHOSPHATE SODIUM, PYRIDOXINE HYDROCHLORIDE, NIACINAMIDE, DEXPANTHENOL, ALPHA-TOCOPHEROL ACETATE, VITAMIN K1, FOLIC ACID, BIOTIN, CYANOCOBALAMIN SCH: 200; 3300; 200; 6; 3.6; 6; 40; 15; 10; 150; 600; 60; 5 INJECTION, SOLUTION INTRAVENOUS at 15:30

## 2020-12-17 RX ADMIN — POTASSIUM CHLORIDE SCH: 149 INJECTION, SOLUTION, CONCENTRATE INTRAVENOUS at 17:49

## 2020-12-17 RX ADMIN — DOXYCYCLINE SCH MLS/HR: 100 INJECTION, POWDER, LYOPHILIZED, FOR SOLUTION INTRAVENOUS at 11:08

## 2020-12-17 RX ADMIN — PIPERACILLIN SODIUM AND TAZOBACTAM SODIUM SCH MLS/HR: .25; 2 INJECTION, POWDER, LYOPHILIZED, FOR SOLUTION INTRAVENOUS at 17:19

## 2020-12-17 RX ADMIN — POTASSIUM CHLORIDE SCH MLS/HR: 149 INJECTION, SOLUTION, CONCENTRATE INTRAVENOUS at 23:44

## 2020-12-17 RX ADMIN — DOXYCYCLINE SCH MLS/HR: 100 INJECTION, POWDER, LYOPHILIZED, FOR SOLUTION INTRAVENOUS at 22:20

## 2020-12-17 RX ADMIN — PIPERACILLIN SODIUM AND TAZOBACTAM SODIUM SCH MLS/HR: .25; 2 INJECTION, POWDER, LYOPHILIZED, FOR SOLUTION INTRAVENOUS at 01:14

## 2020-12-17 RX ADMIN — POTASSIUM CHLORIDE SCH MLS/HR: 149 INJECTION, SOLUTION, CONCENTRATE INTRAVENOUS at 04:51

## 2020-12-17 RX ADMIN — ASCORBIC ACID, VITAMIN A PALMITATE, CHOLECALCIFEROL, THIAMINE HYDROCHLORIDE, RIBOFLAVIN-5 PHOSPHATE SODIUM, PYRIDOXINE HYDROCHLORIDE, NIACINAMIDE, DEXPANTHENOL, ALPHA-TOCOPHEROL ACETATE, VITAMIN K1, FOLIC ACID, BIOTIN, CYANOCOBALAMIN SCH ML: 200; 3300; 200; 6; 3.6; 6; 40; 15; 10; 150; 600; 60; 5 INJECTION, SOLUTION INTRAVENOUS at 04:53

## 2020-12-17 RX ADMIN — PIPERACILLIN SODIUM AND TAZOBACTAM SODIUM SCH MLS/HR: .25; 2 INJECTION, POWDER, LYOPHILIZED, FOR SOLUTION INTRAVENOUS at 09:01

## 2020-12-18 LAB
ALBUMIN SERPL-MCNC: 1.7 G/DL (ref 3.4–5)
ALP SERPL-CCNC: 185 U/L (ref 45–117)
ALT SERPL-CCNC: 27 U/L (ref 13–61)
ANION GAP SERPL CALC-SCNC: 10 MMOL/L (ref 8–16)
AST SERPL-CCNC: 44 U/L (ref 15–37)
BASOPHILS # BLD: 1.5 % (ref 0–2)
BILIRUB SERPL-MCNC: 0.6 MG/DL (ref 0.2–1)
BUN SERPL-MCNC: 31.1 MG/DL (ref 7–18)
CALCIUM SERPL-MCNC: 8 MG/DL (ref 8.5–10.1)
CHLORIDE SERPL-SCNC: 115 MMOL/L (ref 98–107)
CO2 SERPL-SCNC: 18 MMOL/L (ref 21–32)
CREAT SERPL-MCNC: 0.7 MG/DL (ref 0.55–1.3)
DEPRECATED RDW RBC AUTO: 14.6 % (ref 11.9–15.9)
EOSINOPHIL # BLD: 1.2 % (ref 0–4.5)
GLUCOSE SERPL-MCNC: 91 MG/DL (ref 74–106)
HCT VFR BLD CALC: 30.3 % (ref 35.4–49)
HGB BLD-MCNC: 10.3 GM/DL (ref 11.7–16.9)
LYMPHOCYTES # BLD: 20.7 % (ref 8–40)
MAGNESIUM SERPL-MCNC: 1.7 MG/DL (ref 1.8–2.4)
MCH RBC QN AUTO: 33.5 PG (ref 25.7–33.7)
MCHC RBC AUTO-ENTMCNC: 33.9 G/DL (ref 32–35.9)
MCV RBC: 98.6 FL (ref 80–96)
MONOCYTES # BLD AUTO: 4.2 % (ref 3.8–10.2)
NEUTROPHILS # BLD: 72.4 % (ref 42.8–82.8)
PLATELET # BLD AUTO: 184 K/MM3 (ref 134–434)
PMV BLD: 9.6 FL (ref 7.5–11.1)
PROT SERPL-MCNC: 6 G/DL (ref 6.4–8.2)
RBC # BLD AUTO: 3.08 M/MM3 (ref 4–5.6)
SODIUM SERPL-SCNC: 143 MMOL/L (ref 136–145)
WBC # BLD AUTO: 7.2 K/MM3 (ref 4–10)

## 2020-12-18 RX ADMIN — PIPERACILLIN SODIUM AND TAZOBACTAM SODIUM SCH MLS/HR: .25; 2 INJECTION, POWDER, LYOPHILIZED, FOR SOLUTION INTRAVENOUS at 03:13

## 2020-12-18 RX ADMIN — PANTOPRAZOLE SODIUM SCH MLS/HR: 40 INJECTION, POWDER, FOR SOLUTION INTRAVENOUS at 10:19

## 2020-12-18 RX ADMIN — OLIVE OIL AND SOYBEAN OIL SCH MLS/HR: 16; 4 INJECTION, EMULSION INTRAVENOUS at 00:00

## 2020-12-18 RX ADMIN — DOXYCYCLINE SCH MLS/HR: 100 INJECTION, POWDER, LYOPHILIZED, FOR SOLUTION INTRAVENOUS at 10:18

## 2020-12-18 RX ADMIN — POTASSIUM CHLORIDE SCH: 149 INJECTION, SOLUTION, CONCENTRATE INTRAVENOUS at 06:42

## 2020-12-18 RX ADMIN — PIPERACILLIN SODIUM AND TAZOBACTAM SODIUM SCH MLS/HR: .25; 2 INJECTION, POWDER, LYOPHILIZED, FOR SOLUTION INTRAVENOUS at 11:13

## 2020-12-18 RX ADMIN — ENOXAPARIN SODIUM SCH MG: 40 INJECTION SUBCUTANEOUS at 10:18

## 2020-12-21 VITALS — SYSTOLIC BLOOD PRESSURE: 124 MMHG | DIASTOLIC BLOOD PRESSURE: 74 MMHG | HEART RATE: 96 BPM | TEMPERATURE: 97.5 F
